# Patient Record
Sex: MALE | Race: WHITE | NOT HISPANIC OR LATINO | URBAN - METROPOLITAN AREA
[De-identification: names, ages, dates, MRNs, and addresses within clinical notes are randomized per-mention and may not be internally consistent; named-entity substitution may affect disease eponyms.]

---

## 2020-02-24 ENCOUNTER — APPOINTMENT (RX ONLY)
Dept: URBAN - METROPOLITAN AREA CLINIC 319 | Facility: CLINIC | Age: 60
Setting detail: DERMATOLOGY
End: 2020-02-24

## 2020-02-24 DIAGNOSIS — L71.8 OTHER ROSACEA: ICD-10-CM

## 2020-02-24 PROCEDURE — ? PRESCRIPTION

## 2020-02-24 PROCEDURE — 99202 OFFICE O/P NEW SF 15 MIN: CPT

## 2020-02-24 PROCEDURE — ? FULL BODY SKIN EXAM - DECLINED

## 2020-02-24 PROCEDURE — ? COUNSELING

## 2020-02-24 RX ADMIN — METRONIDAZOLE: 7.5 CREAM TOPICAL at 00:00

## 2020-02-24 ASSESSMENT — LOCATION SIMPLE DESCRIPTION DERM: LOCATION SIMPLE: LEFT CHEEK

## 2020-02-24 ASSESSMENT — LOCATION ZONE DERM: LOCATION ZONE: FACE

## 2020-02-24 ASSESSMENT — LOCATION DETAILED DESCRIPTION DERM: LOCATION DETAILED: LEFT CENTRAL MALAR CHEEK

## 2020-02-25 RX ORDER — METRONIDAZOLE 7.5 MG/G
CREAM TOPICAL
Qty: 1 | Refills: 5 | Status: ERX | COMMUNITY
Start: 2020-02-24

## 2023-03-10 LAB — SARS-COV-2 RESULT: NOT DETECTED

## 2023-07-07 ENCOUNTER — DOCUMENTATION (OUTPATIENT)
Dept: CARE COORDINATION | Facility: CLINIC | Age: 63
End: 2023-07-07
Payer: COMMERCIAL

## 2023-07-21 ENCOUNTER — DOCUMENTATION (OUTPATIENT)
Dept: CARE COORDINATION | Facility: CLINIC | Age: 63
End: 2023-07-21
Payer: COMMERCIAL

## 2023-08-24 ENCOUNTER — PATIENT OUTREACH (OUTPATIENT)
Dept: CARE COORDINATION | Facility: CLINIC | Age: 63
End: 2023-08-24
Payer: COMMERCIAL

## 2023-08-24 NOTE — PROGRESS NOTES
Patient has been identified to meet criteria for eligibility for the PCCL program. The  reached out to the patient who stated they were at work. It is best to contact prior to or around 11am. Rescheduled outreach for 8/29/23.

## 2023-08-29 ENCOUNTER — PATIENT OUTREACH (OUTPATIENT)
Dept: CARE COORDINATION | Facility: CLINIC | Age: 63
End: 2023-08-29
Payer: COMMERCIAL

## 2023-08-29 NOTE — PROGRESS NOTES
Patient has been identified to meet criteria for eligibility for the PCCL program. LVM to provide information.

## 2023-09-14 ENCOUNTER — LAB (OUTPATIENT)
Dept: LAB | Facility: LAB | Age: 63
End: 2023-09-14
Payer: COMMERCIAL

## 2023-09-14 LAB
ALANINE AMINOTRANSFERASE (SGPT) (U/L) IN SER/PLAS: 18 U/L (ref 10–52)
ALBUMIN (G/DL) IN SER/PLAS: 4 G/DL (ref 3.4–5)
ALKALINE PHOSPHATASE (U/L) IN SER/PLAS: 105 U/L (ref 33–136)
ANION GAP IN SER/PLAS: 11 MMOL/L (ref 10–20)
ASPARTATE AMINOTRANSFERASE (SGOT) (U/L) IN SER/PLAS: 16 U/L (ref 9–39)
BASOPHILS (10*3/UL) IN BLOOD BY AUTOMATED COUNT: 0.08 X10E9/L (ref 0–0.1)
BASOPHILS/100 LEUKOCYTES IN BLOOD BY AUTOMATED COUNT: 1 % (ref 0–2)
BILIRUBIN TOTAL (MG/DL) IN SER/PLAS: 0.6 MG/DL (ref 0–1.2)
CALCIDIOL (25 OH VITAMIN D3) (NG/ML) IN SER/PLAS: 32 NG/ML
CALCIUM (MG/DL) IN SER/PLAS: 9.2 MG/DL (ref 8.6–10.3)
CARBON DIOXIDE, TOTAL (MMOL/L) IN SER/PLAS: 27 MMOL/L (ref 21–32)
CHLORIDE (MMOL/L) IN SER/PLAS: 104 MMOL/L (ref 98–107)
CHOLESTEROL (MG/DL) IN SER/PLAS: 132 MG/DL (ref 0–199)
CHOLESTEROL IN HDL (MG/DL) IN SER/PLAS: 48.3 MG/DL
CHOLESTEROL/HDL RATIO: 2.7
COBALAMIN (VITAMIN B12) (PG/ML) IN SER/PLAS: 668 PG/ML (ref 211–911)
CREATININE (MG/DL) IN SER/PLAS: 0.74 MG/DL (ref 0.5–1.3)
EOSINOPHILS (10*3/UL) IN BLOOD BY AUTOMATED COUNT: 0.41 X10E9/L (ref 0–0.7)
EOSINOPHILS/100 LEUKOCYTES IN BLOOD BY AUTOMATED COUNT: 5.2 % (ref 0–6)
ERYTHROCYTE DISTRIBUTION WIDTH (RATIO) BY AUTOMATED COUNT: 16.8 % (ref 11.5–14.5)
ERYTHROCYTE MEAN CORPUSCULAR HEMOGLOBIN CONCENTRATION (G/DL) BY AUTOMATED: 30.8 G/DL (ref 32–36)
ERYTHROCYTE MEAN CORPUSCULAR VOLUME (FL) BY AUTOMATED COUNT: 77 FL (ref 80–100)
ERYTHROCYTES (10*6/UL) IN BLOOD BY AUTOMATED COUNT: 5.75 X10E12/L (ref 4.5–5.9)
GFR MALE: >90 ML/MIN/1.73M2
GLUCOSE (MG/DL) IN SER/PLAS: 200 MG/DL (ref 74–99)
HEMATOCRIT (%) IN BLOOD BY AUTOMATED COUNT: 44.2 % (ref 41–52)
HEMOGLOBIN (G/DL) IN BLOOD: 13.6 G/DL (ref 13.5–17.5)
IMMATURE GRANULOCYTES/100 LEUKOCYTES IN BLOOD BY AUTOMATED COUNT: 0.4 % (ref 0–0.9)
LDL: 64 MG/DL (ref 0–99)
LEUKOCYTES (10*3/UL) IN BLOOD BY AUTOMATED COUNT: 7.8 X10E9/L (ref 4.4–11.3)
LYMPHOCYTES (10*3/UL) IN BLOOD BY AUTOMATED COUNT: 1.57 X10E9/L (ref 1.2–4.8)
LYMPHOCYTES/100 LEUKOCYTES IN BLOOD BY AUTOMATED COUNT: 20 % (ref 13–44)
MONOCYTES (10*3/UL) IN BLOOD BY AUTOMATED COUNT: 0.7 X10E9/L (ref 0.1–1)
MONOCYTES/100 LEUKOCYTES IN BLOOD BY AUTOMATED COUNT: 8.9 % (ref 2–10)
NEUTROPHILS (10*3/UL) IN BLOOD BY AUTOMATED COUNT: 5.05 X10E9/L (ref 1.2–7.7)
NEUTROPHILS/100 LEUKOCYTES IN BLOOD BY AUTOMATED COUNT: 64.5 % (ref 40–80)
NRBC (PER 100 WBCS) BY AUTOMATED COUNT: 0 /100 WBC (ref 0–0)
PLATELETS (10*3/UL) IN BLOOD AUTOMATED COUNT: 225 X10E9/L (ref 150–450)
POTASSIUM (MMOL/L) IN SER/PLAS: 3.9 MMOL/L (ref 3.5–5.3)
PROTEIN TOTAL: 6.7 G/DL (ref 6.4–8.2)
SODIUM (MMOL/L) IN SER/PLAS: 138 MMOL/L (ref 136–145)
THYROTROPIN (MIU/L) IN SER/PLAS BY DETECTION LIMIT <= 0.05 MIU/L: 0.6 MIU/L (ref 0.44–3.98)
TOBACCO SCREEN, URINE: NEGATIVE
TRIGLYCERIDE (MG/DL) IN SER/PLAS: 101 MG/DL (ref 0–149)
URATE (MG/DL) IN SER/PLAS: 2.3 MG/DL (ref 4–7.5)
UREA NITROGEN (MG/DL) IN SER/PLAS: 15 MG/DL (ref 6–23)
VLDL: 20 MG/DL (ref 0–40)

## 2023-09-19 ENCOUNTER — TELEPHONE (OUTPATIENT)
Dept: CARE COORDINATION | Facility: CLINIC | Age: 63
End: 2023-09-19
Payer: COMMERCIAL

## 2023-10-02 PROBLEM — E11.69 ERECTILE DYSFUNCTION DUE TO TYPE 2 DIABETES MELLITUS (MULTI): Status: ACTIVE | Noted: 2023-10-02

## 2023-10-02 PROBLEM — Z87.442 PERSONAL HISTORY OF URINARY CALCULI: Status: ACTIVE | Noted: 2023-03-15

## 2023-10-02 PROBLEM — Z79.01 LONG TERM (CURRENT) USE OF ANTICOAGULANTS: Status: ACTIVE | Noted: 2023-03-15

## 2023-10-02 PROBLEM — M54.40 LUMBAGO WITH SCIATICA: Status: ACTIVE | Noted: 2023-03-15

## 2023-10-02 PROBLEM — Z86.59 HISTORY OF DEPRESSION: Status: ACTIVE | Noted: 2023-10-02

## 2023-10-02 PROBLEM — I10 HYPERTENSION: Status: ACTIVE | Noted: 2023-10-02

## 2023-10-02 PROBLEM — N20.0 CALCULUS OF KIDNEY: Status: ACTIVE | Noted: 2019-04-03

## 2023-10-02 PROBLEM — F51.01 PRIMARY INSOMNIA: Status: ACTIVE | Noted: 2023-03-15

## 2023-10-02 PROBLEM — R68.83 CHILLS: Status: ACTIVE | Noted: 2023-10-02

## 2023-10-02 PROBLEM — R35.0 BENIGN PROSTATIC HYPERPLASIA WITH URINARY FREQUENCY: Status: ACTIVE | Noted: 2023-10-02

## 2023-10-02 PROBLEM — K52.9 ENTERITIS: Status: ACTIVE | Noted: 2023-10-02

## 2023-10-02 PROBLEM — F41.0 GENERALIZED ANXIETY DISORDER WITH PANIC ATTACKS: Status: ACTIVE | Noted: 2023-03-15

## 2023-10-02 PROBLEM — Z87.891 PERSONAL HISTORY OF NICOTINE DEPENDENCE: Status: ACTIVE | Noted: 2023-03-15

## 2023-10-02 PROBLEM — R52 BODY ACHES: Status: ACTIVE | Noted: 2023-10-02

## 2023-10-02 PROBLEM — E66.811 CLASS 1 OBESITY WITH BODY MASS INDEX (BMI) OF 34.0 TO 34.9 IN ADULT: Status: ACTIVE | Noted: 2023-10-02

## 2023-10-02 PROBLEM — Z96.641 STATUS POST TOTAL REPLACEMENT OF RIGHT HIP: Status: ACTIVE | Noted: 2023-05-27

## 2023-10-02 PROBLEM — F41.1 GENERALIZED ANXIETY DISORDER WITH PANIC ATTACKS: Status: ACTIVE | Noted: 2023-03-15

## 2023-10-02 PROBLEM — G62.9 PERIPHERAL NEUROPATHY: Status: ACTIVE | Noted: 2023-10-02

## 2023-10-02 PROBLEM — F43.10 PTSD (POST-TRAUMATIC STRESS DISORDER): Status: ACTIVE | Noted: 2023-10-02

## 2023-10-02 PROBLEM — E78.2 MIXED HYPERLIPIDEMIA: Status: ACTIVE | Noted: 2023-03-15

## 2023-10-02 PROBLEM — E11.36: Status: ACTIVE | Noted: 2023-10-02

## 2023-10-02 PROBLEM — N40.1 BENIGN PROSTATIC HYPERPLASIA WITH URINARY FREQUENCY: Status: ACTIVE | Noted: 2023-10-02

## 2023-10-02 PROBLEM — Z96.641 PRESENCE OF RIGHT ARTIFICIAL HIP JOINT: Status: ACTIVE | Noted: 2023-03-15

## 2023-10-02 PROBLEM — N40.1 BENIGN PROSTATIC HYPERPLASIA WITH LOWER URINARY TRACT SYMPTOMS: Status: ACTIVE | Noted: 2023-03-15

## 2023-10-02 PROBLEM — E55.9 VITAMIN D DEFICIENCY: Status: ACTIVE | Noted: 2023-10-02

## 2023-10-02 PROBLEM — K21.9 GERD (GASTROESOPHAGEAL REFLUX DISEASE): Status: ACTIVE | Noted: 2023-10-02

## 2023-10-02 PROBLEM — E66.9 CLASS 1 OBESITY WITH BODY MASS INDEX (BMI) OF 34.0 TO 34.9 IN ADULT: Status: ACTIVE | Noted: 2023-10-02

## 2023-10-02 PROBLEM — Z79.84 LONG TERM (CURRENT) USE OF ORAL HYPOGLYCEMIC DRUGS: Status: ACTIVE | Noted: 2023-03-15

## 2023-10-02 PROBLEM — K52.9 NONINFECTIVE GASTROENTERITIS AND COLITIS: Status: ACTIVE | Noted: 2023-03-15

## 2023-10-02 PROBLEM — E11.42 DIABETIC POLYNEUROPATHY ASSOCIATED WITH TYPE 2 DIABETES MELLITUS (MULTI): Status: ACTIVE | Noted: 2023-03-15

## 2023-10-02 PROBLEM — E66.9 OBESITY: Status: ACTIVE | Noted: 2023-03-15

## 2023-10-02 PROBLEM — N52.1 ERECTILE DYSFUNCTION DUE TO TYPE 2 DIABETES MELLITUS (MULTI): Status: ACTIVE | Noted: 2023-10-02

## 2023-10-02 RX ORDER — SERTRALINE HYDROCHLORIDE 100 MG/1
150 TABLET, FILM COATED ORAL
COMMUNITY
End: 2023-10-27

## 2023-10-02 RX ORDER — BUSPIRONE HYDROCHLORIDE 30 MG/1
30 TABLET ORAL 2 TIMES DAILY
COMMUNITY
End: 2024-01-09 | Stop reason: SDUPTHER

## 2023-10-02 RX ORDER — PANTOPRAZOLE SODIUM 40 MG/1
40 TABLET, DELAYED RELEASE ORAL DAILY
COMMUNITY
End: 2023-12-12 | Stop reason: SDUPTHER

## 2023-10-02 RX ORDER — METOPROLOL TARTRATE 100 MG/1
100 TABLET ORAL 2 TIMES DAILY
COMMUNITY
End: 2023-12-12 | Stop reason: SDUPTHER

## 2023-10-02 RX ORDER — TAMSULOSIN HYDROCHLORIDE 0.4 MG/1
0.4 CAPSULE ORAL DAILY PRN
COMMUNITY

## 2023-10-02 RX ORDER — METHOCARBAMOL 500 MG/1
500 TABLET, FILM COATED ORAL 2 TIMES DAILY
COMMUNITY
End: 2023-12-12 | Stop reason: SDUPTHER

## 2023-10-02 RX ORDER — SERTRALINE HYDROCHLORIDE 100 MG/1
100 TABLET, FILM COATED ORAL EVERY MORNING
COMMUNITY
End: 2023-10-27

## 2023-10-02 RX ORDER — CLONAZEPAM 0.5 MG/1
0.5 TABLET ORAL 2 TIMES DAILY PRN
COMMUNITY
End: 2023-12-12 | Stop reason: SDUPTHER

## 2023-10-02 RX ORDER — SILDENAFIL 50 MG/1
50 TABLET, FILM COATED ORAL DAILY
COMMUNITY
End: 2023-12-29 | Stop reason: SDUPTHER

## 2023-10-02 RX ORDER — DOXYCYCLINE 100 MG/1
100 CAPSULE ORAL DAILY
COMMUNITY
End: 2023-12-12 | Stop reason: SDUPTHER

## 2023-10-02 RX ORDER — ONDANSETRON 4 MG/1
4 TABLET, FILM COATED ORAL EVERY 8 HOURS PRN
COMMUNITY
Start: 2023-03-10 | End: 2023-11-28 | Stop reason: WASHOUT

## 2023-10-02 RX ORDER — PRAZOSIN HYDROCHLORIDE 1 MG/1
1 CAPSULE ORAL NIGHTLY
COMMUNITY
Start: 2023-08-30 | End: 2023-10-27

## 2023-10-02 RX ORDER — IBUPROFEN 600 MG/1
600 TABLET ORAL 2 TIMES DAILY PRN
COMMUNITY
Start: 2023-08-31

## 2023-10-02 RX ORDER — ATORVASTATIN CALCIUM 20 MG/1
20 TABLET, FILM COATED ORAL DAILY
COMMUNITY
End: 2023-12-12 | Stop reason: SDUPTHER

## 2023-10-02 RX ORDER — ASPIRIN 81 MG/1
1 TABLET ORAL DAILY
COMMUNITY

## 2023-10-02 RX ORDER — DICLOFENAC SODIUM 10 MG/G
GEL TOPICAL
COMMUNITY

## 2023-10-02 RX ORDER — METHOCARBAMOL 500 MG/1
500 TABLET, FILM COATED ORAL DAILY
COMMUNITY
Start: 2023-03-01 | End: 2023-11-28 | Stop reason: WASHOUT

## 2023-10-02 RX ORDER — DOCUSATE SODIUM 100 MG/1
100 CAPSULE, LIQUID FILLED ORAL DAILY
COMMUNITY
Start: 2023-03-10 | End: 2023-11-28 | Stop reason: WASHOUT

## 2023-10-02 RX ORDER — BUSPIRONE HYDROCHLORIDE 10 MG/1
10 TABLET ORAL DAILY
COMMUNITY
Start: 2023-03-01 | End: 2023-10-27 | Stop reason: SDUPTHER

## 2023-10-02 RX ORDER — APIXABAN 2.5 MG/1
2.5 TABLET, FILM COATED ORAL 2 TIMES DAILY
COMMUNITY
Start: 2023-03-14 | End: 2023-11-28 | Stop reason: WASHOUT

## 2023-10-02 RX ORDER — IBUPROFEN 800 MG/1
800 TABLET ORAL 2 TIMES DAILY PRN
COMMUNITY
End: 2023-11-28 | Stop reason: WASHOUT

## 2023-10-02 RX ORDER — BLOOD SUGAR DIAGNOSTIC
1 STRIP MISCELLANEOUS
COMMUNITY

## 2023-10-02 RX ORDER — METOPROLOL SUCCINATE 100 MG/1
100 TABLET, EXTENDED RELEASE ORAL 2 TIMES DAILY
COMMUNITY
Start: 2023-03-01 | End: 2023-11-28 | Stop reason: WASHOUT

## 2023-10-02 RX ORDER — DULOXETIN HYDROCHLORIDE 60 MG/1
60 CAPSULE, DELAYED RELEASE ORAL DAILY
COMMUNITY
End: 2024-01-09 | Stop reason: SDUPTHER

## 2023-10-02 RX ORDER — ONDANSETRON 4 MG/1
TABLET, FILM COATED ORAL EVERY 12 HOURS
COMMUNITY
End: 2023-11-28 | Stop reason: WASHOUT

## 2023-10-02 RX ORDER — LOSARTAN POTASSIUM 100 MG/1
100 TABLET ORAL DAILY
COMMUNITY
End: 2023-12-12 | Stop reason: ALTCHOICE

## 2023-10-02 RX ORDER — ACETAMINOPHEN 500 MG
TABLET ORAL
COMMUNITY
Start: 2023-03-14 | End: 2023-11-28 | Stop reason: WASHOUT

## 2023-10-02 RX ORDER — POLYETHYLENE GLYCOL 3350 17 G/17G
17 POWDER, FOR SOLUTION ORAL DAILY
COMMUNITY
Start: 2023-03-14 | End: 2023-11-28 | Stop reason: WASHOUT

## 2023-10-02 RX ORDER — CLONAZEPAM 0.5 MG/1
0.5 TABLET ORAL DAILY
COMMUNITY
Start: 2023-03-01 | End: 2023-11-28 | Stop reason: WASHOUT

## 2023-10-02 RX ORDER — LOSARTAN POTASSIUM 50 MG/1
100 TABLET ORAL DAILY
COMMUNITY
End: 2023-12-12 | Stop reason: SDUPTHER

## 2023-10-02 RX ORDER — HYDROCHLOROTHIAZIDE 12.5 MG/1
12.5 TABLET ORAL EVERY MORNING
COMMUNITY
End: 2023-11-28 | Stop reason: WASHOUT

## 2023-10-04 ENCOUNTER — CLINICAL SUPPORT (OUTPATIENT)
Dept: BEHAVIORAL HEALTH | Facility: CLINIC | Age: 63
End: 2023-10-04
Payer: COMMERCIAL

## 2023-10-04 VITALS
HEART RATE: 71 BPM | TEMPERATURE: 97.3 F | WEIGHT: 215.39 LBS | SYSTOLIC BLOOD PRESSURE: 157 MMHG | HEIGHT: 66 IN | BODY MASS INDEX: 34.62 KG/M2 | RESPIRATION RATE: 16 BRPM | DIASTOLIC BLOOD PRESSURE: 81 MMHG

## 2023-10-04 DIAGNOSIS — F33.1 MODERATE EPISODE OF RECURRENT MAJOR DEPRESSIVE DISORDER (MULTI): ICD-10-CM

## 2023-10-04 DIAGNOSIS — F43.10 PTSD (POST-TRAUMATIC STRESS DISORDER): ICD-10-CM

## 2023-10-04 DIAGNOSIS — F41.0 GENERALIZED ANXIETY DISORDER WITH PANIC ATTACKS: ICD-10-CM

## 2023-10-04 DIAGNOSIS — F41.1 GENERALIZED ANXIETY DISORDER WITH PANIC ATTACKS: ICD-10-CM

## 2023-10-04 PROCEDURE — 99214 OFFICE O/P EST MOD 30 MIN: CPT

## 2023-10-04 RX ORDER — LAMOTRIGINE 25 MG/1
TABLET ORAL
Qty: 30 TABLET | Refills: 1 | Status: SHIPPED | OUTPATIENT
Start: 2023-10-04 | End: 2023-11-28 | Stop reason: WASHOUT

## 2023-10-04 ASSESSMENT — PAIN SCALES - GENERAL: PAINLEVEL: 0-NO PAIN

## 2023-10-04 NOTE — PROGRESS NOTES
"Subjective   Patient is a 63 year old male with past medical history of IDDM, GERD, and total hip replacement, presenting to outpatient psychiatry for follow up evaluation and treatment or depression and PTSD.     Chief Complaint: Depression     Patient was unable to tolerate Prazosin. He stopped medication after a few doses. He continues to experience nightmares.     Patient reports that he is feeling edgy today, mostly from problems at work and with one coworker in particular. Patient reports that he feels frustrated and often feels as if there is a \"target on his back.\"      Patient appears irritable and he describes frustration, feelings of hopelessness, and feelings of worthlessness. He is disappointed that he hasn't been able to find a medication that effectively treats his symptoms    Patient is finding it difficult to get restless sleep though his appetite is described as \"OK.\"     Patient denies suicidal plan or intent but he endorses passive SI but stating, \"I would never act on my thoughts.: Patient is anhedonic but does enjoy spending time with his cat and finds that the cat soothes his anxiety. Patient denies panic attacks.       Depression  Visit Type: follow-up  Patient presents with the following symptoms: anhedonia, decreased concentration, depressed mood, fatigue, feelings of hopelessness, feelings of worthlessness, insomnia, irritability, restlessness, suicidal ideas and thoughts of death.  Patient is not experiencing: compulsions, confusion, excessive worry, hypersomnia, hyperventilation, memory impairment, muscle tension, nervousness/anxiety, obsessions, palpitations, panic, psychomotor agitation, psychomotor retardation and suicidal planning.  Frequency of symptoms: constantly   Severity: moderate   Sleep quality: fair  Nighttime awakenings: occasional  Compliance with medications:  %  Side effects:  Dry mouth    Review of Systems   Constitutional:  Positive for fatigue and irritability. " Negative for activity change, appetite change, chills, diaphoresis, fever and unexpected weight change.   HENT: Negative.     Eyes:  Negative for photophobia, pain, discharge, redness and itching.        Patient wears eyeglasses   Respiratory: Negative.     Cardiovascular: Negative.  Negative for palpitations.   Gastrointestinal: Negative.    Endocrine: Negative.    Genitourinary: Negative.    Musculoskeletal:  Positive for arthralgias and myalgias. Negative for back pain, gait problem and joint swelling.   Allergic/Immunologic: Negative.    Neurological:  Negative for dizziness, tremors, seizures, syncope, speech difficulty, weakness, light-headedness, numbness and headaches.   Psychiatric/Behavioral:  Positive for decreased concentration, depression, dysphoric mood, sleep disturbance and suicidal ideas. Negative for agitation, behavioral problems, confusion, hallucinations and self-injury. The patient has insomnia. The patient is not nervous/anxious and is not hyperactive.        Objective   Physical Exam  Constitutional:       Appearance: Normal appearance. He is normal weight.   HENT:      Head: Normocephalic and atraumatic.   Eyes:      Extraocular Movements: Extraocular movements intact.   Pulmonary:      Effort: Pulmonary effort is normal. No respiratory distress.      Breath sounds: No stridor.   Abdominal:      General: There is no distension.      Tenderness: There is no abdominal tenderness.   Musculoskeletal:         General: Normal range of motion.      Cervical back: Normal range of motion.   Skin:     General: Skin is dry.   Neurological:      General: No focal deficit present.      Mental Status: He is alert and oriented to person, place, and time. Mental status is at baseline.   Psychiatric:         Attention and Perception: Attention and perception normal.         Mood and Affect: Mood is depressed. Affect is blunt.         Speech: Speech normal.         Behavior: Behavior normal.         Thought  Content: Thought content normal. Thought content is not paranoid or delusional. Thought content does not include homicidal or suicidal ideation. Thought content does not include homicidal or suicidal plan.         Cognition and Memory: Cognition and memory normal.         Judgment: Judgment normal.      Comments: See mental status exam in screening section          Lab Review:   Lab on 09/14/2023   Component Date Value    Tobacco Screen, Urine 09/14/2023 NEGATIVE    Orders Only on 09/14/2023   Component Date Value    Cholesterol 09/14/2023 132     HDL 09/14/2023 48.3     Cholesterol/HDL Ratio 09/14/2023 2.7     LDL 09/14/2023 64     VLDL 09/14/2023 20     Triglycerides 09/14/2023 101     TSH 09/14/2023 0.60     Glucose 09/14/2023 200 (H)     Sodium 09/14/2023 138     Potassium 09/14/2023 3.9     Chloride 09/14/2023 104     Bicarbonate 09/14/2023 27     Anion Gap 09/14/2023 11     Urea Nitrogen 09/14/2023 15     Creatinine 09/14/2023 0.74     GFR MALE 09/14/2023 >90     Calcium 09/14/2023 9.2     Albumin 09/14/2023 4.0     Alkaline Phosphatase 09/14/2023 105     Total Protein 09/14/2023 6.7     AST 09/14/2023 16     Total Bilirubin 09/14/2023 0.6     ALT (SGPT) 09/14/2023 18     Vitamin D, 25-Hydroxy 09/14/2023 32     Uric Acid 09/14/2023 2.3 (L)     Vitamin B-12 09/14/2023 668     WBC 09/14/2023 7.8     nRBC 09/14/2023 0.0     RBC 09/14/2023 5.75     Hemoglobin 09/14/2023 13.6     Hematocrit 09/14/2023 44.2     MCV 09/14/2023 77 (L)     MCHC 09/14/2023 30.8 (L)     Platelets 09/14/2023 225     RDW 09/14/2023 16.8 (H)     Neutrophils % 09/14/2023 64.5     Immature Granulocytes %,* 09/14/2023 0.4     Lymphocytes % 09/14/2023 20.0     Monocytes % 09/14/2023 8.9     Eosinophils % 09/14/2023 5.2     Basophils % 09/14/2023 1.0     Neutrophils Absolute 09/14/2023 5.05     Lymphocytes Absolute 09/14/2023 1.57     Monocytes Absolute 09/14/2023 0.70     Eosinophils Absolute 09/14/2023 0.41     Basophils Absolute 09/14/2023  0.08        Assessment/Plan     Safety Assessment: (+)  current and past SI, no SA, no guns. RF include age, male gender, depression. PF pet cat, limited substance use history, . moderate risk     Diagnoses and all orders for this visit:  Moderate episode of recurrent major depressive disorder (CMS/HCC) Patient endorses depressed mood with passive SI and denies HI. Patient has failed multiple drug trials and would be placed in the treatment resistant depression category. Patient should continue in therapy to work on coping skills and to process his grief following his wife's death. Will start Lamictal today to help with mood lability and hopefully address his tendency to slide into a depression. 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 Patient verbally acknowledged symptoms to report and agrees to begin taking the medication     - lamoTRIgine (LaMICtal) 25 mg tablet; Take 1 tablet (25 mg) by mouth once daily, THEN 1 tablet (25 mg) once daily.  -Continue Duloxetine - 60 mg - take 1 tablet daily by mouth for depression     PTSD - Prazosin was not tolerated and has been discontinued. Continue to work in therapy with Dr. Larsen    Generalized Anxiety Disorder with Panic Attacks - patient continues to have baseline anxiety with infrequent panic attacks. Will continue current medications and recommend therapy     Continue - 30 mg Buspirone - take 1 tablet, twice daily for anxiety   Continue - 0.5 mg Clonazepam - take 1 tablet, twice daily as needed for panic attacks.     Follow up in one month

## 2023-10-05 ENCOUNTER — PATIENT OUTREACH (OUTPATIENT)
Dept: CARE COORDINATION | Facility: CLINIC | Age: 63
End: 2023-10-05
Payer: COMMERCIAL

## 2023-10-05 NOTE — PROGRESS NOTES
Patient has been identified to meet criteria for eligibility for the PCCL program. LVM for patient for outreach.

## 2023-10-07 ENCOUNTER — OFFICE VISIT (OUTPATIENT)
Dept: BEHAVIORAL HEALTH | Facility: CLINIC | Age: 63
End: 2023-10-07
Payer: COMMERCIAL

## 2023-10-07 DIAGNOSIS — F41.8 DEPRESSION WITH ANXIETY: ICD-10-CM

## 2023-10-07 PROCEDURE — 4010F ACE/ARB THERAPY RXD/TAKEN: CPT | Performed by: PSYCHOLOGIST

## 2023-10-07 PROCEDURE — 90837 PSYTX W PT 60 MINUTES: CPT | Performed by: PSYCHOLOGIST

## 2023-10-07 NOTE — PROGRESS NOTES
"2 PM 63094 Indiv Psych for Depression with Anxiety (60 MIN, 205-300). Virtual. Started lamotrigine (25 mg/day). Still reporting nightmare, last night, no longer on prazosin. Discussed work situation, boss \"ambushed\" him. Boss indicated a coworker had complained he refused to do something, considered it snitching. However, explained situation and indicated that it could be done in AM which was a customary practice. Additionally, was heard swearing, f-bomb, doesn't recall when it occurred. Anxious about event, never in trouble, trying  to decide what to do. Encouraged him to give it more time to cool before making a more permanent decision. Difficulty managing co-worker talking all the time. Old hs classmate invited out, went for walk. She's in [place] but visiting, enjoyed time. Some health concerns, high A1C, said snacking too much on sugar, and should cut snack at end of day. Doctor wants to increase jardiance and lower bp med. Next: 2 weeks.   "

## 2023-10-07 NOTE — PROGRESS NOTES
"2 PM 54678 Indiv Psych for Depression with Anxiety (60 MIN, 205-300). Virtual. Started lamotrigine (25 mg/day). Still reporting nightmare, last night, no longer on prazosin. Discussed work situation, boss \"ambushed\" him. Boss indicated a coworker had complained he refused to do something, considered it snitching. However, explained situation and indicated that it could be done in AM which was a customary practice. Additionally, was heard swearing, f-bomb, doesn't recall when it occurred. Anxious about event, never in trouble, trying  to decide what to do. Encouraged him to give it more time to cool before making a more permanent decision. Difficulty managing co-worker talking all the time. Old hs classmate invited out, went for walk. She's in [place] but visiting, enjoyed time. Some health concerns, high A1C, said snacking too much on sugar, and should cut snack at end of day. Doctor wants to increase jardiance and lower bp med. Next: 2 weeks.         "

## 2023-10-07 NOTE — PROGRESS NOTES
"2 PM 69153 Indiv Psych for Depression with Anxiety (60 MIN, 205-300). Virtual. Started lamotrigine (25 mg/day). Still reporting nightmare, last night, no longer on prazosin. Discussed work situation, boss \"ambushed\" him. Boss indicated a coworker had complained he refused to do something, considered it snitching. However, explained situation and indicated that it could be done in AM which was a customary practice. Additionally, was heard swearing, f-bomb, doesn't recall when it occurred. Anxious about event, never in trouble, trying  to decide what to do. Encouraged him to give it more time to cool before making a more permanent decision. Difficulty managing co-worker talking all the time. Old hs classmate invited out, went for walk. She's in [place] but visiting, enjoyed time. Some health concerns, high A1C, said snacking too much on sugar, and should cut snack at end of day. Doctor wants to increase jardiance and lower bp med. Next: 2 weeks.         "

## 2023-10-10 ENCOUNTER — TELEMEDICINE (OUTPATIENT)
Dept: BEHAVIORAL HEALTH | Facility: CLINIC | Age: 63
End: 2023-10-10
Payer: COMMERCIAL

## 2023-10-10 DIAGNOSIS — F41.9 ANXIETY: ICD-10-CM

## 2023-10-10 DIAGNOSIS — F33.1 MODERATE EPISODE OF RECURRENT MAJOR DEPRESSIVE DISORDER (MULTI): ICD-10-CM

## 2023-10-10 PROCEDURE — 90837 PSYTX W PT 60 MINUTES: CPT | Performed by: PSYCHOLOGIST

## 2023-10-10 ASSESSMENT — COLUMBIA-SUICIDE SEVERITY RATING SCALE - C-SSRS
REASONS FOR IDEATION PAST MONTH: MOSTLY TO END OR STOP THE PAIN (YOU COULDN'T GO ON LIVING WITH THE PAIN OR HOW YOU WERE FEELING)
1. HAVE YOU WISHED YOU WERE DEAD OR WISHED YOU COULD GO TO SLEEP AND NOT WAKE UP?: YES
1. IN THE PAST MONTH, HAVE YOU WISHED YOU WERE DEAD OR WISHED YOU COULD GO TO SLEEP AND NOT WAKE UP?: YES
2. HAVE YOU ACTUALLY HAD ANY THOUGHTS OF KILLING YOURSELF?: NO
TOTAL  NUMBER OF ACTUAL ATTEMPTS LIFETIME: 1
REASONS FOR IDEATION LIFETIME: MOSTLY TO END OR STOP THE PAIN (YOU COULDN'T GO ON LIVING WITH THE PAIN OR HOW YOU WERE FEELING)
ATTEMPT LIFETIME: YES
ATTEMPT PAST THREE MONTHS: NO

## 2023-10-10 ASSESSMENT — ENCOUNTER SYMPTOMS
COMPULSIONS: 0
HALLUCINATIONS: 0
HOPELESSNESS: 1
APPETITE CHANGE: 0
ANHEDONIA: 1
RESTLESSNESS: 1
LIGHT-HEADEDNESS: 0
CHILLS: 0
HEADACHES: 0
EYE REDNESS: 0
MUSCLE TENSION: 0
SLEEP QUALITY: FAIR
ACTIVITY CHANGE: 0
EYE DISCHARGE: 0
NUMBNESS: 0
RESPIRATORY NEGATIVE: 1
UNEXPECTED WEIGHT CHANGE: 0
FEELINGS OF WORTHLESSNESS: 1
HYPERACTIVE: 0
CONFUSION: 0
DIZZINESS: 0
EYE ITCHING: 0
EYE PAIN: 0
THOUGHTS THAT DEATH WOULD BE EASIER: 1
ARTHRALGIAS: 1
INSOMNIA: 1
NIGHTTIME AWAKENINGS: OCCASIONAL
FATIGUE: 1
SLEEP DISTURBANCE: 1
SEIZURES: 0
CARDIOVASCULAR NEGATIVE: 1
GASTROINTESTINAL NEGATIVE: 1
MYALGIAS: 1
DEPRESSION: 1
PALPITATIONS: 0
THOUGHT CONTENT - OBSESSIONS: 0
DYSPHORIC MOOD: 1
PANIC: 0
ENDOCRINE NEGATIVE: 1
DIAPHORESIS: 0
FEVER: 0
BACK PAIN: 0
NERVOUS/ANXIOUS: 0
AGITATION: 0
PSYCHOMOTOR AGITATION: 0
WEAKNESS: 0
PSYCHOMOTOR RETARDATION: 0
MEMORY IMPAIRMENT: 0
JOINT SWELLING: 0
IRRITABILITY: 1
HYPERSOMNIA: 0
SPEECH DIFFICULTY: 0
DEPRESSED MOOD: 1
TREMORS: 0
HYPERVENTILATION: 0
PHOTOPHOBIA: 0
ALLERGIC/IMMUNOLOGIC NEGATIVE: 1
DECREASED CONCENTRATION: 1

## 2023-10-10 NOTE — PATIENT INSTRUCTIONS
Plan for supportive psychotherapy    6. Follow up in one month     May follow up sooner if experiences worsening symptoms by calling  Psychiatry at [phone]      Patient verbalized an understanding to call [place] [facility] at [phone] ([facility]), [phone], or 778/go to the nearest emergency room if experiences thoughts of harm to self or others.

## 2023-10-10 NOTE — PROGRESS NOTES
"8 AM 38637 Indiv Psych for MDD, Recurrent, Anxiety (r/o SANDOR) (60 MIN, 689-182)  Not doing well. Is wondering if lamotrigine is causing problems, noted he's sensitive to medications. Has reached out to his pharmacologist. More hopeless ideation-\"If I wasn't here, no one would miss me. What do I have here?\" \"No wife, no kids, no care home money, feel hopeless.\" Said he has his cat, Barbara but no one would miss him at work. Barriers to killing self include his cat, doesn't want to end up worse, \"quadriplegic\", doesn't want to go to hell. Began to discussed options for treatment including IOP (, [place]), ECT, more consistently meeting with me and pharmacologist. He participated in an IOP at [facility] in the past, found it helpful. At that time, meeting with psychiatrist, Leodan Nino, got out of relationship with Ayesha. Had 6 ECT's in 2015 which helped then felt good when in [place]. This was shortly after Elin, wife . Said life and mood has been \"a roller coaster.\" Will complete FMLA work, will refer to IOP for questions, also he agreed to contact  at  who's been reaching out about an individualized program for him. Agreed to keep in more close contact. Next scheduled meetin week.    "

## 2023-10-11 ENCOUNTER — TELEPHONE (OUTPATIENT)
Dept: CARE COORDINATION | Facility: CLINIC | Age: 63
End: 2023-10-11
Payer: COMMERCIAL

## 2023-10-11 ENCOUNTER — PATIENT OUTREACH (OUTPATIENT)
Dept: CARE COORDINATION | Facility: CLINIC | Age: 63
End: 2023-10-11
Payer: COMMERCIAL

## 2023-10-11 NOTE — PROGRESS NOTES
Patient has been identified to meet criteria for eligibility for the PCCL program. Spoke with patient who is enrolling in program. Scheduled assessment for 10/18/2023 for 12:30pm. Will determine schedule and then schedule for Dr. Kat. Patient is currently concerned about medication interactions and coordination. Has went multiple medication changes and would like assistant with making sure all care is coordinated.

## 2023-10-18 ENCOUNTER — TELEMEDICINE (OUTPATIENT)
Dept: BEHAVIORAL HEALTH | Facility: CLINIC | Age: 63
End: 2023-10-18
Payer: COMMERCIAL

## 2023-10-18 ENCOUNTER — PATIENT OUTREACH (OUTPATIENT)
Dept: CARE COORDINATION | Facility: CLINIC | Age: 63
End: 2023-10-18

## 2023-10-18 DIAGNOSIS — F41.9 ANXIETY: ICD-10-CM

## 2023-10-18 DIAGNOSIS — F33.1 MODERATE EPISODE OF RECURRENT MAJOR DEPRESSIVE DISORDER (MULTI): ICD-10-CM

## 2023-10-18 PROCEDURE — 90837 PSYTX W PT 60 MINUTES: CPT | Performed by: PSYCHOLOGIST

## 2023-10-18 NOTE — PROGRESS NOTES
Outreach call to conduct quarterly assessment for Personalized Care for Complex Lives program. Spoke with patient, completed assessment.    Discussed being on intermittent FMLA to assist with dealing with symptoms.   Assessed for social needs- Discussed not currently having extended family and struggling with feeling lonely. Patient identified being social has been more of struggle moving back to [place]. Reports being back 3 for years. No children and .   Utilities- No needs identified.   Current mental state- Patient stated that his symptoms decreased dramatically after he stopped taking Lamictal. Informed that he had negative reactions to that medication.    No other needs currently identified.    Provided my contact information ([phone]) for future needs.  Scheduled patient follow up call.

## 2023-10-19 NOTE — PROGRESS NOTES
"2 PM 83554 Indiv Psych for MDD, Recurrent and Anxiety (60 MIN, 205-258)  Virtual. Supportive Therapy. Back to work, will complete FMLA paperwork for patient. Boss called him into office about two day miss from work. Reviewed again his meeting re. Not taking stuff to cath lab and F-bomb incident. Told boss, \"feel I have a target on my back.\" \"Still feel numb emotions. Discontinued lamotrigine. 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 Discussed PCC Program, had virtual visit with a clinical  at  and will meet with psychiatrist, Dr. Kat next week. Discussed difficulties he's had with meds, \"everything I take something I feel more depressed.\" Feels like things have been bad since returning to [place] three years ago. Felt he had more social supports and activities in other places. We discussed importance of him getting more involved, has been talking about getting back to [employer]'s witness, was involved in Je[employer]'s witness in the past. Also, used to [employer].  Does have a few friends. Close friend, Ruddy and partner, Leah, moved to [facility], also has friend Jay and Kat who will be moving over the next few years. Discussed how this friend is \"far right\" politically but doesn't discuss and they're able to find common ground. Also, has friend, Norah, he gets together with. Friend's   a month after his wife . Talks about going back to places like [place] and [facility], \"miss the mountains.\" Will complete FMLA paperwork. Next: 2 weeks.   "

## 2023-10-20 ENCOUNTER — APPOINTMENT (OUTPATIENT)
Dept: BEHAVIORAL HEALTH | Facility: CLINIC | Age: 63
End: 2023-10-20
Payer: COMMERCIAL

## 2023-10-27 ENCOUNTER — TELEMEDICINE (OUTPATIENT)
Dept: BEHAVIORAL HEALTH | Facility: CLINIC | Age: 63
End: 2023-10-27
Payer: COMMERCIAL

## 2023-10-27 DIAGNOSIS — F33.1 MODERATE EPISODE OF RECURRENT MAJOR DEPRESSIVE DISORDER (MULTI): ICD-10-CM

## 2023-10-27 PROCEDURE — 99205 OFFICE O/P NEW HI 60 MIN: CPT | Performed by: STUDENT IN AN ORGANIZED HEALTH CARE EDUCATION/TRAINING PROGRAM

## 2023-10-27 PROCEDURE — 99417 PROLNG OP E/M EACH 15 MIN: CPT | Performed by: STUDENT IN AN ORGANIZED HEALTH CARE EDUCATION/TRAINING PROGRAM

## 2023-10-27 NOTE — PATIENT INSTRUCTIONS
- remember activation step to reach out to Norah, sister, or Diego before the end of next week  - will start doing therapy around acceptance and openness towards distressing emotions caused by loneliness and rejection  - no medication changes for now    Please send me a message in [facility] if things aren't going as expected or you are unsure about something we discussed. If this isn't working, you can call the psychiatry department line at [phone], or leave me a voicemail at [phone].  If you are having thoughts of harming yourself or ending your life, please call the national suicide hotline 24/7 at 771. For this and other mental health emergencies, such as losing touch with reality, call the [facility] 24/7 mobile crisis hotline at 109-260-2565, or dial 791 for emergency services.

## 2023-10-27 NOTE — Clinical Note
See my assessment of depression. He and I agreed on taking an ACT approach to depression, working on taking small, progressive, persistent steps toward engaging more in social activities, while also doing mindful exposures to the avoided feelings of loneliness and rejection that lead to depression. Dr. Larsen, it's up to you how you'd like to approach this (assuming you agree it makes sense) - could all be done with you, or we could supplement with our therapist (Latonia) to work on the acceptance part while you do the engagement/activation part. Latonia should be able to see him weekly. Open to your suggestions!  Pal, for personalized care team patients, I offer to either temporarily take over as psychiatrist (similar to IOP) or take a more supportive role, and the patient preferred to have me take over for now. But he will come back to you in probably a few months! Please let me know if anything seems off or missing about my conceptualization.

## 2023-10-27 NOTE — ASSESSMENT & PLAN NOTE
Mental habit pattern of not feeling confident that others like him or want him around, which results in loneliness as a childless  with social connections who are mostly  with children/grandchildren, and different priorities. This habit pattern originates in childhood, when his father would always tell him he wasn't good enough, and favored his brother. Has a recurrent dream (nightly for 20 years) about yelling at his father for not caring about him. Chronically suppresses deep sadness about the loss of his wife and tries to avoid feelings of being left out. This leads to further isolation, loss of purpose, and depression. Most medication trials have led to affective flattening, but Wellbutrin (in 2001) led to crying spells, likely because it lifted the depression that was serving to numb out feelings of loneliness and rejection could not be suppressed.    We decided to target the root problem, avoidance of feelings of loneliness and rejection, through mindful exposures, while also targeting environmental barriers to recovery through behavioral activation.  - next achievable step of behavioral activation toward socializing: go out with friend Norah, spend time with sister's family, or call friend Diego. 7/10 confident he can do one of these in the next week.  - mindful exposures with either Dr. Larsen or adding [facility] therapist to focus on that approach while Dr. Larsen focuses on behavioral activation  - no further medication adjustment; could reconsider Wellbutrin if needed for energy and motivation after root emotional distress has been alleviated somewhat  - While enrolled in [facility], I will assume psychiatrist role; plan to return to previous psychiatrist following graduation
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
## 2023-10-28 ENCOUNTER — TELEMEDICINE (OUTPATIENT)
Dept: BEHAVIORAL HEALTH | Facility: CLINIC | Age: 63
End: 2023-10-28
Payer: COMMERCIAL

## 2023-10-28 DIAGNOSIS — F41.9 ANXIETY: ICD-10-CM

## 2023-10-28 DIAGNOSIS — F33.1 MODERATE EPISODE OF RECURRENT MAJOR DEPRESSIVE DISORDER (MULTI): ICD-10-CM

## 2023-10-28 PROCEDURE — 90837 PSYTX W PT 60 MINUTES: CPT | Performed by: PSYCHOLOGIST

## 2023-10-30 NOTE — PROGRESS NOTES
"1 PM 85075 Indiv Psych for MDD, Recurrent and Anxiety (60 MIN, 112-210)  Virtual. Supportive Therapy. Discussed meeting with Dr. Kat and Dr. Kat's observation he's \"suppressing my feelings.\" Looking at his meds. Currently cymbalta 60 mg/day, buspar 2X30 mg/day and clonazepam 1.5 of 0.5 mg/day. Numerous losses over past 10 years. \"Feel trapped here.\" Sees things as not going well since returning to [facility]. However, things no so good prior to moving, mental health, etc. He's applied to a few jobs across country (e.g., [employer], [place]). Encouraged him to focus on improving connectedness to others. He does have a few friends here (e.g., Norah, who works where he does, lost , Ed/wife, Ajy). Meeting up with friend Ed Nov 8 when friend comes back from [facility]. Mentioned one time wanting to be an actor. Broached idea of community theater. Not many interests outside work currently. Completed his [place] paperwork. Next: 2 weeks.   "

## 2023-11-08 ENCOUNTER — TELEMEDICINE (OUTPATIENT)
Dept: BEHAVIORAL HEALTH | Facility: CLINIC | Age: 63
End: 2023-11-08
Payer: COMMERCIAL

## 2023-11-08 ENCOUNTER — APPOINTMENT (OUTPATIENT)
Dept: BEHAVIORAL HEALTH | Facility: CLINIC | Age: 63
End: 2023-11-08
Payer: COMMERCIAL

## 2023-11-08 DIAGNOSIS — F33.1 MODERATE EPISODE OF RECURRENT MAJOR DEPRESSIVE DISORDER (MULTI): ICD-10-CM

## 2023-11-08 PROCEDURE — 90837 PSYTX W PT 60 MINUTES: CPT | Performed by: COUNSELOR

## 2023-11-08 NOTE — PROGRESS NOTES
All Individuals Present: Patient and Provider (Encounter Provider)     ID: Danny Abel is a 63 y.o. male      Pt met with [facility] for follow-up visit for symptoms of depression.    An interactive audio and video telecommunication system which permits real time communications between the patient (at the originating site) and the provider (at the distant site) was utilized to provide this telehealth service. Verbal consent was requested and obtained from Danny Abel on this date 11/08/23 for a telehealth visit.    Pt is meeting with therapist as a part of the Personalized Care for Complex Lives Program.    Mental Status Exam  General Appearance: Well groomed, appropriate eye contact.  Attitude/Behavior: Cooperative, conversant, engaged, and with good eye contact.  Motor: No psychomotor agitation or retardation, no tremor or other abnormal movements.  Speech: Normal rate, volume, prosody  Gait/Station: Within normal limits  Mood: Dysthymic.  Affect: Flat  Thought Process: Linear, goal directed  Thought Associations: No loosening of associations  Thought Content: normal  Sensorium: Alert and oriented to person, place, time and situation  Insight: Intact, as evidenced by Pt discussion  Judgment: Intact      Risk Assessment:  Imminent Risk of Suicide or Serious Self-Injury: None, denied  Imminent Risk of Violence or Homicide: None, denied    Progress Note:  [facility] to establish relationship. Pt identified that he is feeling some extended grief and loneliness. Pt stated that he is looking toward making moves in his life to change and rebuild his self-confidence. [facility] was able to validate Pt and introduced the idea of sitting with his emotions when they surface. [facility] will continue to explore this with Pt at next session.    Start Time: 1:03 pm  End Time: 1:59 pm  CPT Code: 89902    Attestation Statements   Number of minutes spent performing psychotherapy: 56

## 2023-11-11 ENCOUNTER — TELEMEDICINE (OUTPATIENT)
Dept: BEHAVIORAL HEALTH | Facility: CLINIC | Age: 63
End: 2023-11-11
Payer: COMMERCIAL

## 2023-11-11 DIAGNOSIS — F33.1 MODERATE EPISODE OF RECURRENT MAJOR DEPRESSIVE DISORDER (MULTI): ICD-10-CM

## 2023-11-11 DIAGNOSIS — F41.9 ANXIETY: ICD-10-CM

## 2023-11-11 PROCEDURE — 90837 PSYTX W PT 60 MINUTES: CPT | Performed by: PSYCHOLOGIST

## 2023-11-14 NOTE — PROGRESS NOTES
"1 PM 08966 Indiv Psych for MDD, Recurrent and Anxiety (60 MIN, 101-156)  Virtual. Supportive Therapy. Applying for some jobs across the country (e.g., [email or web address], [place], [place]). Trying to be optimistic but hasn't head back from one in [place] after good interview. Still depressed, disconnected. \"Don't like going to [employer].\" He said he has to work harder than previous post and job is more physically demanding. Some anniversary dates--mother  Nov , 10 years since \"Pat and I moved to [place]\" and she . I miss her.\" Thursday, sluggish \"I hit a wall\" felt down, no motivation. Left work early then and took FMLA day yesterday. Feeling a little better today. \"Tired all the time.\" Discussed jobs he's applied to in detail and interview (Oct 31). Still having some problems learning [employer] system at [employer]. Challenging dealing with co-worker but managing better recently. On line dating not working out. Niece reached out for Thanksgiving invite. Also, invited to go to friend's (Ed). Taking robaxin at night, also still on duloxetine (60 mg/day). Still meeting with Dr. Kat. Next: 3 weeks.   "

## 2023-11-16 ENCOUNTER — TELEMEDICINE (OUTPATIENT)
Dept: BEHAVIORAL HEALTH | Facility: CLINIC | Age: 63
End: 2023-11-16
Payer: COMMERCIAL

## 2023-11-16 DIAGNOSIS — F33.1 MODERATE EPISODE OF RECURRENT MAJOR DEPRESSIVE DISORDER (MULTI): ICD-10-CM

## 2023-11-16 DIAGNOSIS — F41.0 GENERALIZED ANXIETY DISORDER WITH PANIC ATTACKS: ICD-10-CM

## 2023-11-16 DIAGNOSIS — F41.1 GENERALIZED ANXIETY DISORDER WITH PANIC ATTACKS: ICD-10-CM

## 2023-11-16 PROCEDURE — 90837 PSYTX W PT 60 MINUTES: CPT | Performed by: COUNSELOR

## 2023-11-16 NOTE — PROGRESS NOTES
All Individuals Present: Patient and Provider (Encounter Provider)     ID: Danny Abel is a 63 y.o. male      Pt met with [facility] for follow-up visit for symptoms of anxiety and depression.    An interactive audio and video telecommunication system which permits real time communications between the patient (at the originating site) and the provider (at the distant site) was utilized to provide this telehealth service. Verbal consent was requested and obtained from Danny Abel on this date 11/16/23 for a telehealth visit.    Pt is meeting with therapist as a part of the Personalized Care for Complex Lives Program.    Mental Status Exam  General Appearance: Well groomed, appropriate eye contact.  Attitude/Behavior: Cooperative, conversant, engaged, and with good eye contact.  Motor: No psychomotor agitation or retardation, no tremor or other abnormal movements.  Speech: Normal rate, volume, prosody  Gait/Station: Within normal limits  Mood: Depressed.  Affect: Sad/tearful and Flat  Thought Process: Linear, goal directed  Thought Associations: No loosening of associations  Thought Content: normal  Sensorium: Alert and oriented to person, place, time and situation  Insight: Fair, as evidenced by Pt discussion  Judgment: Intact      Risk Assessment:  Imminent Risk of Suicide or Serious Self-Injury: None, denied  Imminent Risk of Violence or Homicide: None, denied    Progress Note:  Pt stated that he has been having difficulty managing his mood. He reported that he has been crying a lot and it has been difficult to manage. He shared that he is not enjoying his job and is feeling frustrated. Pt has been attempting to reach out to the IOP therapist at , but has not heard back from her yet. [facility] encouraged Pt to think about reaching out to [place] to talk to them about their IOP groups.     Start Time: 12:03 pm  End Time: 1:00pm  CPT Code: 17684    Attestation Statements   Number of minutes spent  performing psychotherapy: 57

## 2023-11-21 ENCOUNTER — TELEMEDICINE (OUTPATIENT)
Dept: BEHAVIORAL HEALTH | Facility: CLINIC | Age: 63
End: 2023-11-21
Payer: COMMERCIAL

## 2023-11-21 DIAGNOSIS — F33.1 MODERATE EPISODE OF RECURRENT MAJOR DEPRESSIVE DISORDER (MULTI): ICD-10-CM

## 2023-11-21 PROCEDURE — 90834 PSYTX W PT 45 MINUTES: CPT | Performed by: COUNSELOR

## 2023-11-21 NOTE — PROGRESS NOTES
All Individuals Present: Patient and Provider (Encounter Provider)     ID: Danny Abel is a 63 y.o. male      Pt met with [facility] for follow-up visit for symptoms of depression.    An interactive audio and video telecommunication system which permits real time communications between the patient (at the originating site) and the provider (at the distant site) was utilized to provide this telehealth service. Verbal consent was requested and obtained from Danny Abel on this date 11/21/23 for a telehealth visit.    Pt is meeting with therapist as a part of the Personalized Care for Complex Lives Program.    Mental Status Exam  General Appearance: Well groomed, appropriate eye contact.  Attitude/Behavior: Cooperative, conversant, engaged, and with good eye contact.  Motor: No psychomotor agitation or retardation, no tremor or other abnormal movements.  Speech: Normal rate, volume, prosody  Gait/Station: Within normal limits  Mood: Dysthymic.  Affect: Flat  Thought Process: Linear, goal directed  Thought Associations: No loosening of associations  Thought Content: normal  Sensorium: Alert and oriented to person, place, time and situation  Insight: Fair, as evidenced by Pt discussion  Judgment: Intact      Risk Assessment:  Imminent Risk of Suicide or Serious Self-Injury: None, denied  Imminent Risk of Violence or Homicide: None, denied    Progress Note:  Pt shared that he is going to participate in the IOP groups at  in the next few weeks. He noted that he would like it to be in person, but he is okay to participate in virtual programming. Pt stated that his friends don't think it's a good idea for him to move away right now. He reported that he does sometimes feel like he is running away. Pt noted that he has not worked in any one location for more than a year since his wife's passing. [facility] mentioned to Pt that he may be using the excitement of changing jobs as a type of addictive behavior as it distracts  him from experiencing grief. Pt noted that he does think he is avoiding his grief.    Start Time: 12:04 pm  End Time: 12:54 pm  CPT Code: 85285    Attestation Statements   Number of minutes spent performing psychotherapy: 50

## 2023-11-22 ENCOUNTER — PATIENT OUTREACH (OUTPATIENT)
Dept: CARE COORDINATION | Facility: CLINIC | Age: 63
End: 2023-11-22

## 2023-11-22 ENCOUNTER — TELEMEDICINE (OUTPATIENT)
Dept: BEHAVIORAL HEALTH | Facility: CLINIC | Age: 63
End: 2023-11-22
Payer: COMMERCIAL

## 2023-11-22 DIAGNOSIS — F33.2 SEVERE RECURRENT MAJOR DEPRESSION WITHOUT PSYCHOTIC FEATURES (MULTI): ICD-10-CM

## 2023-11-22 DIAGNOSIS — F41.1 GENERALIZED ANXIETY DISORDER: ICD-10-CM

## 2023-11-22 PROCEDURE — 90791 PSYCH DIAGNOSTIC EVALUATION: CPT | Mod: U2,95

## 2023-11-22 NOTE — PROGRESS NOTES
The  reached out to the patient regarding Personalized Care for Complex Lives wellness check and to follow up on goals progress.   Patient confirmed no food insecurities, stable housing and assessed for social needs, utilities, current mental state.    Resources provided this meeting included: Information on [employer] and privacy accessing records.   Barriers addressed: Discussed wanting his records to be safeguarded.   The patient confirmed no SI/actions at this time.   Scheduled follow up check in for the following: When information is confirmed.   Provided contact information if contact is needed prior to next scheduled follow up.
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
## 2023-11-28 ENCOUNTER — TELEMEDICINE (OUTPATIENT)
Dept: BEHAVIORAL HEALTH | Facility: CLINIC | Age: 63
End: 2023-11-28
Payer: COMMERCIAL

## 2023-11-28 ENCOUNTER — APPOINTMENT (OUTPATIENT)
Dept: BEHAVIORAL HEALTH | Facility: CLINIC | Age: 63
End: 2023-11-28
Payer: COMMERCIAL

## 2023-11-28 DIAGNOSIS — Z79.899 MEDICATION MANAGEMENT: ICD-10-CM

## 2023-11-28 DIAGNOSIS — F41.1 GAD (GENERALIZED ANXIETY DISORDER): ICD-10-CM

## 2023-11-28 DIAGNOSIS — F33.1 MODERATE EPISODE OF RECURRENT MAJOR DEPRESSIVE DISORDER (MULTI): ICD-10-CM

## 2023-11-28 DIAGNOSIS — N52.1 ERECTILE DYSFUNCTION DUE TO TYPE 2 DIABETES MELLITUS (MULTI): ICD-10-CM

## 2023-11-28 DIAGNOSIS — E11.69 ERECTILE DYSFUNCTION DUE TO TYPE 2 DIABETES MELLITUS (MULTI): ICD-10-CM

## 2023-11-28 PROCEDURE — 99214 OFFICE O/P EST MOD 30 MIN: CPT | Performed by: NURSE PRACTITIONER

## 2023-11-28 RX ORDER — BUPROPION HYDROCHLORIDE 150 MG/1
150 TABLET ORAL EVERY MORNING
Qty: 90 TABLET | Refills: 3 | Status: SHIPPED | OUTPATIENT
Start: 2023-11-28 | End: 2024-01-09 | Stop reason: SDUPTHER

## 2023-11-28 ASSESSMENT — ENCOUNTER SYMPTOMS: CONSTITUTIONAL NEGATIVE: 1

## 2023-11-28 NOTE — PROGRESS NOTES
"Complex Care Follow-Up    Subjective   Danny Arcadio Briggspratik \"Leodan\" is a 63 y.o. male with ***    Previous Treatment Plan      Interim History      Psychiatric Medications      Objective   Mental Status Exam:       Lab Review:   {Recent labs:19471::\"not applicable\"}    Assessment/Plan     Problem List Items Addressed This Visit    None      {Follow Up:36713}    Suicide Risk Assessment  {TDSILIST:24676}    "

## 2023-11-28 NOTE — PROGRESS NOTES
"HPI  Danny Abel \"Leodan\" is a 63 y.o. male patient with a chief complaint of   Chief Complaint   Patient presents with    Anxiety    Depression    Med Management    presenting to outpatient treatment for a scheduled psych intensive outpatient psychiatric med check.    NOTE: Symptom scale is rated where 0 = no symptoms at all, and 10 = symptoms so severe that pt is an imminent danger to themselves or others and requires hospitalization.    Anxiety and Depression remain(s) present more days than not, which has remain unchanged over the past few weeks. Danny Abel rates the severity of psych symptoms as a 5/10, noting symptom improvement with watching TV, and worsening of symptoms with grief re: death of wife (Elin) in ~ and work-related stressors.    -Mood: \"I feel depressed a lot. I feel like I have no purpose in life. I wish I would have something to perk me up. Feel blah. My wife \" ~10 yrs ago \"and I still haven't come back from that.\" Reports Cymbalta helps \"a little bit with neuropathy.\"  -Sleep/Energy/Motivation: \"I like to sleep a lot - that's when I feel most at peace/comfortable.\" Denies issues falling/staying asleep - finds clonazepam 0.75mg helpful for sleep.  -Appetite/Weight Changes: denies issues.  -Psychosis: denies issues.  -SI/HI: denies issues this visit but does endorse feelings of worthlessness/hopelessness.      HISTORY  -Psych Hx: Dr. Bentley Larsen (psychologist - ); Dr. Ace Kat (psychiatrist - ); hx of multiple providers and therapists in various states. Attended IOP in  s/p end of  marriage.  -Psych Med Hx: gabapentin (for neuropathy - felt really depressed/tired, even on 100mg/day); sertraline (on it ~20 yrs, historically did well on 150mg/day, switched to Cymbalta to try to help with both psych symptoms and neuropathy); prazosin (\"I was so zoned out/tired\"); lamotrigine (\"that was horrible,\" increased SI); bupropion (first antidepressant pt tried in " "2001, \"I got crying jags, but I still have it - and that was 21 years ago\"); oxcarbazepine and risperidone (\"that wasn't good\"); nortriptyline 80mg (under care of psychiatrist, \"it was giving me dry mouth,\" but found it helpful for depression).  -Substance Use Hx: denies illicit substance use.  -Tobacco: endorses \"occasional use,\" denies currently using.  -Family Psych Hx: mother (anxiety/depression); father (depression)  -Family Hx of substance abuse: father (EtOH).  -Supports: endorses lack of social support (has moved many times since death of wife in ~2013).  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  -Education: Associates  -The past, family, and social history has been reviewed and there are no findings pertinent to the chief complaint.       REVIEW OF SYSTEMS  Review of Systems   Constitutional: Negative.    All other systems reviewed and are negative.  Objective   Physical Exam  Psychiatric:         Attention and Perception: Attention and perception normal.         Mood and Affect: Mood is anxious and depressed. Affect is flat.         Speech: Speech normal.         Behavior: Behavior normal. Behavior is cooperative.         Thought Content: Thought content normal.         Cognition and Memory: Cognition and memory normal.         Judgment: Judgment normal.       MEDICAL-DECISION MAKING  Mood-wise reporting primarily depressive (but also anxious) symptoms in context of chronic grief. After reviewing med regimen, seems as pt did best on sertraline. Had difficulty with bupropion as monotherapy, but might tolerate better with a SSRI on board. Could augment duloxetine with bupropion, but may be too much norepinephrine reuptake inhibition. After discussion mutually agreed to start bupropion now given the limited time in IOP, then, depending on how pt responds, may look to swap out duloxetine for sertraline.      I have reviewed the intake assessment and certify its " validity. Danny Abel has the physical capacity to participate and tolerate the interventions provided in the IOP schedule and curriculum. Aftercare attendance s/p discharge from [facility] was discussed.     Psych med regimen as follows:   1. Duloxetine 60mg/day  2. Buspirone 30mg BID  3. Clonazepam 0.75mg (0.5mg x1.5)/day-PRN (OARRS shows prescribed by Caden Augustin, DO up to 0.5mg BID, 30-day supply last filled 10/21/2023)  4. START: bupropion XL 150mg QAM    IMPRESSION  -SANDOR  -MDD, recurrent, moderate - active  -Grief    SI/HI ASSESSMENT  -Risk Assessment: The pt is currently a low acute risk of suicide and self-harm due to no past suicide attempt(s) and not currently endorsing thoughts of suicide. The pt is currently a low acute risk of violence and harm to others due to no past history of violence and not currently threatening others.  -Suicidal Risk Factors: , male, unmarried/single, living alone/lack of social support, chronic medical illness, chronic pain, current psychiatric illness, life crisis/shame/despair, and feelings of hopelessness  -Violence Risk Factors: male  -Protective Factors: fear of suicide, positive family relationships, and employment  -Plan to Reduce Risk: Establish medication regimen, outpatient follow-up care, and increase coping skills       PLAN  -Continue Medications as directed.  -Admit to IOP for up to 4 days/week or 12 hours/week starting approximately 11/28/2023.   -Follow-up with this provider in 1 week in [facility].  -Risks/benefits/assessment of medication interventions discussed with pt; pt agreeable to plan. Will continue to monitor for symptoms mgmt and SEs and adjust plan as needed.  -MI to increase coping skills/behavior regulation.  -Safety plan reviewed.  -Call  Psychiatry at [phone] with issues.  -For [facility] residents, [employer] is a 24/7 hotline you can call for assistance at [phone]) [phone]. Please call 911 or go to your closest Emergency Room  if you feel worse. This includes thoughts of hurting yourself or anyone else, or having other troubles such as hearing voices, seeing visions, or having new and scary thoughts about the people around you.

## 2023-11-29 ENCOUNTER — CLINICAL SUPPORT (OUTPATIENT)
Dept: BEHAVIORAL HEALTH | Facility: CLINIC | Age: 63
End: 2023-11-29
Payer: COMMERCIAL

## 2023-11-29 DIAGNOSIS — F41.1 GAD (GENERALIZED ANXIETY DISORDER): ICD-10-CM

## 2023-11-29 DIAGNOSIS — F33.2 SEVERE RECURRENT MAJOR DEPRESSION WITHOUT PSYCHOTIC FEATURES (MULTI): ICD-10-CM

## 2023-11-29 PROCEDURE — 90853 GROUP PSYCHOTHERAPY: CPT | Mod: U2,95

## 2023-11-30 ENCOUNTER — CLINICAL SUPPORT (OUTPATIENT)
Dept: BEHAVIORAL HEALTH | Facility: CLINIC | Age: 63
End: 2023-11-30
Payer: COMMERCIAL

## 2023-11-30 DIAGNOSIS — F41.1 GAD (GENERALIZED ANXIETY DISORDER): ICD-10-CM

## 2023-11-30 DIAGNOSIS — F33.2 SEVERE RECURRENT MAJOR DEPRESSION WITHOUT PSYCHOTIC FEATURES (MULTI): ICD-10-CM

## 2023-11-30 PROCEDURE — 90853 GROUP PSYCHOTHERAPY: CPT | Mod: U2,95

## 2023-11-30 NOTE — GROUP NOTE
Group Topic: Social Skills   Group Date: 11/30/2023  Start Time: 11:00 AM  End Time: 12:00 PM  Facilitators: OLVIN Anderson; OLVIN Aguila   [facility]    This was a video IOP group on a HIPAA compliant platform. All patients were provided with informed consent.     Date: 11/30/2023, Time: 11a-12p, Number of Patients: 10, User Name: [email or web address],  Number of Staff: 2  Group topic(s): defense mechanisms. The group explored the concept of defense mechanisms including when we may use them and the ways they may help us and hurt us. As a whole discussion was had on different types of defense mechanisms including denial, displacement, projection, rationalization, reaction formation, regression, repression, sublimation, and intellectualization. Examples and personal situations regarding using defense mechanisms in their day to day lives.    Name: Leodan Abel YOB: 1960   MR: [id number]      Leodan was on topic and present. Patient followed along and maintained attentiveness appropriately. Patient offered examples of where engaging in defense mechanisms could become detrimental to us.   OLVIN Wilder    Secondary facilitator: Luiz Ferguson, CT   CT supervised by OLVIN Hernández-S, [facility]

## 2023-11-30 NOTE — GROUP NOTE
Group Topic: Feeling Awareness/Expression   Group Date: 11/29/2023  Start Time:  9:00 AM  End Time: 10:00 AM  Facilitators: OLVIN Anderson; OLVIN Aguila   Department: [facility]    This was a video IOP group on a HIPAA compliant platform. All patients were provided with informed consent.     Date: 11/29/2023, Time: 9-10a, Number of Patients: 11, User Name: [email or web address],  Number of Staff: 2  Group topic(s): dino, thorn, bud reflection. The group engaged in exploring a reflection of their week thus far by identifying their dino (a positive, a highlight or a success), thorn (a challenge or something you can use more support with) and a bud (new idea or something you're looking forward to knowing or understanding more). Group discussion followed.      Name: Leodan Abel YOB: 1960   MR: [id number]      Leodan was on topic and participative. Patient followed along appropriately and engaged fully in exercise.   OLVIN Wilder    Secondary facilitator: RONIT العراقي  CT supervised by OLVIN Hernández-S, [facility]     
Group Topic: Social Skills   Group Date: 11/29/2023  Start Time: 10:00 AM  End Time: 11:00 AM  Facilitators: OLVIN Aguila; OLVIN Anderson   [facility]    Number of Participants: 12   Group Focus: communication, healthy friendships, self-awareness, and social skills  Treatment Modality: Behavior Modification Therapy, Cognitive Behavioral Therapy, Patient-Centered Therapy, and Skills Training  Interventions utilized were patient education, problem solving, and support  Purpose: coping skills, communication skills, insight or knowledge, and self-care    Name: Leodan Abel YOB: 1960   MR: [id number]    This session was conducted via HIPAA compliant video. Patient was provided with informed consent.  Date: 11/29/2023  Time: 10:00 am to 11:00 am  Group Members: 12  Number of Staff: 2  [email or web address]    Group Topic:  Group started with a review of Rozina Martin's 77203 method and how they tried it in the last 24 hours. The group then focused on psychoeducation of healthy vs. unhealthy boundaries. Appropriate boundaries were defined and group members identified individuals in their personal lives they would like to create healthier boundaries with and why.     Leodan was attentive and visible on camera. He appeared engaged in the group discussion but did not contribute. Today is his first session in [facility].    Primary Facilitator - JEFF Hernández, LILIYA  Secondary Facilitator - RONIT العراقي  Supervised by JEFF Hernández, [facility]  
Group Topic: Social Skills   Group Date: 11/29/2023  Start Time: 11:00 AM  End Time: 12:00 PM  Facilitators: OLVIN Aguila; OLVIN Anderson   [facility]    Number of Participants: 11   Group Focus: communication, coping skills, healthy friendships, problem solving, and social skills  Treatment Modality: Behavior Modification Therapy, Cognitive Behavioral Therapy, Patient-Centered Therapy, and Skills Training  Interventions utilized were exploration, group exercise, and problem solving  Purpose: coping skills, communication skills, and self-care    Name: Leodan Abel YOB: 1960   MR: [id number]    This session was conducted via HIPAA compliant video. Patient was provided with informed consent.  Date: 11/29/2023  Time: 11:00 am to 12:00 pm  Group Members: 11  Number of Staff: 2  [email or web address]    Group Topic:  Boundaries continued. The group continued to review what healthy boundaries entail and steps to take to develop them in their personal lives.     Leodan was attentive and visible on camera. He appeared to be actively listening to the group discussion. Today is his first session in [facility] and he seemed to be getting acclimating to the group experience.    Primary Facilitator - JEFF Hernández, LILIYA  Secondary Facilitator - RONIT العراقي  Supervised by JEFF Hernández, LILIYA    
Ambulatory

## 2023-11-30 NOTE — GROUP NOTE
Group Topic: Social Skills   Group Date: 11/30/2023  Start Time: 10:00 AM  End Time: 11:00 AM  Facilitators: OLVIN Anderson; OLVIN Aguila   [facility]    This was a video IOP group on a HIPAA compliant platform. All patients were provided with informed consent.     Date: 11/30/2023, Time: 10-11a, Number of Patients: 10, User Name: [email or web address],  Number of Staff: 2  Group topic(s): defense mechanisms. The group explored the concept of defense mechanisms including when we may use them and the ways they may help us and hurt us. As a whole discussion was had on different types of defense mechanisms including denial, displacement, projection, and rationalization. Examples and personal situations regarding using defense mechanisms in their day to day lives.    Name: Leodan Abel YOB: 1960   MR: [id number]      Leodan was attentive and on topic. Patient followed along fully and maintained nonverbal engagement throughout group exercise.   OLVIN Wilder    Secondary facilitator: Luiz Ferguson, CT   CT supervised by OLVIN Hernández-S, [facility]

## 2023-12-01 ENCOUNTER — DOCUMENTATION (OUTPATIENT)
Dept: BEHAVIORAL HEALTH | Facility: CLINIC | Age: 63
End: 2023-12-01
Payer: COMMERCIAL

## 2023-12-01 NOTE — PROGRESS NOTES
INTENSIVE OUTPATIENT PROGRAM MULTIDISCIPLINARY  TREATMENT PLAN & PROGRESS NOTE     Patient: Danny Abel  : 1960  Age: 63     Student: No  Treatment Team Date:    MRN#: [id number]    Attending Physician: Dr. BENTON Lopez MD  Date of IOP Admission: 23   Ref by: Dr. Larsen  Week: 1                        Admission Scores:   DASS21: 43 ANN: 25    BDI:  45 PHQ-9: 20 MAST: 0 DAST: 0 MDQ: Negative      Current Risk Assessment:  Suicidal Risk:  None:      Ideation: X       Plan:      Intent:      SI Plan with plan contracts for safety:  Hx of harming self:        Homicidal Risk:  None: X     Ideation:       Plan:      Intent:      HI Plan with means:  Hx of harming others:          Global Improvement  Clinical Global Impressions Rating Scale Of Illness:  6  1-No Illness Present   2-Minimal   3-Mild   4-Moderate   5-Marked   6-Severe X    7-Very Severe    Diagnoses & ICD-10 Codes  Primary Diagnosis & Code: Major Depressive Disorder, Recurrent, Severe; F33.2  Secondary Diagnosis & Code: Generalized Anxiety Disorder; F41.1     Psychosocial & Environmental Stressors:   1. Dissatisfaction with work   2. Lack of social support   3. Grief/loss    Patient's Treatment Goals:  Date Initiated:           Progress Update:               1.  Attend and actively participate in IOP _4_ days/week for 3 hours per day  2023   Good    Fair   Poor  Unclear X   2.  Attend weekly medication management sessions and maintain compliance of medications  2023     Good    Fair   Poor  Unclear X                                                   3.  Identify symptoms of depression and anxiety while developing coping plans  2023   Good    Fair   Poor  Unclear X                               4.  Increase self-esteem and self-worth while increasing social connections   2023  Good    Fair   Poor  Unclear X      Current medications:  See EMR chart       Progress note:  _X_New to the IOP program, attending as  planned  __Compliant, Progressing & Improving - Needs more time in IOP therapy program   __Compliant, Progressing & Improving Planning Discharge   __Compliant, Not Progressing or Improving: Reason  __Non-Compliant, not progressing or improving: Plan  __Other:    Insurance & Benefits: [facility], IN NETWORK, BENEFITS ARE BASED ON MEDICAL NECESSITY ONLY: Unlimited visits, covers 100% of the contracted rate after deductible has been met.     Co-Pay per day: $0    DEDUCTIBLE        Single: / Family: / Accumulation:  Single: /  Family: /   CO- INSURANCE  Single: / Family: / Accumulation:  Single: /  Family: /    OUT OF POCKET  Single: 1,600  Family: / Accumulation: Single: 1,600 Family: /     Total IOP Sessions completed & authorized to date:  2    Discharge plans have been discussed with patient & medication management provider on:   Reason for discharge:    ___Successfully completed IOP treatment:   ___Pt. decided to seek treatment elsewhere:   ___Dropped out or no show more than three times:  ___Benefits exhausted:   ___Other:    Discharge date:                     Discharge Prognosis: Excellent      Good     Fair     Poor    Follow up appointment with: Physician:   Follow up appointment with: Therapist:    Follow up Aftercare group?  Yes   Patient provided with Crisis Hotline phone number for suicide prevention? Yes     Discharge Scores: Not Completed    Emergency Treatment Plan Completed by patient: Yes__  No__          DASS21:   ANN:   BDI:   Treatment plan electronically signed by Treatment Team:   JUAN Nguyen, LPCC-S, [facility], MEJIA Somers, SABAC, JUAN Ball, PsSammi, JUAN Perla, APRN-CNP

## 2023-12-01 NOTE — GROUP NOTE
Group Topic: Goals   Group Date: 11/30/2023  Start Time:  9:00 AM  End Time: 10:00 AM  Facilitators: Ny Somers [facility]; OLVIN Aguila   Department: [facility]        Name: Leodan Abel YOB: 1960   MR: [id number]      This was a video IOP group on a HIPAA compliant platform. All patients were provided with informed consent.   Date: 11/30/2023, Time: 9-10a, Number of Patients: 11, User Name: JEFF Orourke, ATR, Number of Staff: 2  Group topic(s): SMART goal setting group today. Group members identified achievement goals, enjoyment goals, and social connection goals - as well as anticipated obstacles and coping skills. Prior to writing and discussing goals, the group revisited their strength-based goals set on Monday. Leodan plans to complete goals of shopping, calling a friend, taking a nap, and watching football. He emphasized how it was helpful to think about starting small with his goes, as the concept initially overwhelmed him. Leodan anticipates obstacles depression, low motivation, and anxiety. He plans to take deep breaths and call someone as coping mechanisms.    Primary Facilitator: Luiz Ferguson CT  Supervised by JEFF Hernández, [facility]     Secondary Facilitator: JEFF Hernández, [facility]

## 2023-12-02 ENCOUNTER — APPOINTMENT (OUTPATIENT)
Dept: BEHAVIORAL HEALTH | Facility: CLINIC | Age: 63
End: 2023-12-02
Payer: COMMERCIAL

## 2023-12-04 ENCOUNTER — CLINICAL SUPPORT (OUTPATIENT)
Dept: BEHAVIORAL HEALTH | Facility: CLINIC | Age: 63
End: 2023-12-04
Payer: COMMERCIAL

## 2023-12-04 DIAGNOSIS — F33.2 SEVERE RECURRENT MAJOR DEPRESSION WITHOUT PSYCHOTIC FEATURES (MULTI): ICD-10-CM

## 2023-12-04 DIAGNOSIS — F41.1 GAD (GENERALIZED ANXIETY DISORDER): ICD-10-CM

## 2023-12-04 PROCEDURE — 90853 GROUP PSYCHOTHERAPY: CPT | Mod: U2,95

## 2023-12-04 NOTE — GROUP NOTE
Group Topic: Goals   Group Date: 12/4/2023  Start Time:  9:00 AM  End Time: 10:00 AM  Facilitators: OLVIN Aguila; OLVIN Anderson   [facility]    Number of Participants: 12   Group Focus: activities of daily living skills, check in, daily focus, and goals  Treatment Modality: Behavior Modification Therapy, Cognitive Behavioral Therapy, and Patient-Centered Therapy  Interventions utilized were exploration, leisure development, problem solving, and support  Purpose: coping skills, insight or knowledge, self-care, and trigger / craving management    Name: Leodan Abel YOB: 1960   MR: [id number]    This was a video IOP group on a HIPAA compliant platform. All patients were provided with informed consent.     Date: 12/4/2023 Time: 9-10a, Number of Patients: 12, User Name: [email or web address], Number of Staff: 2    Group topic(s): SMART goal weekend review    Group members took turns sharing progress and challenges they faced this weekend related to their identified SMART goals.     Danny was present on camera and appeared attentive. He volunteered to share his SMART goals. He was successful in meeting the goals he set out to accomplish. 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 Another goal he wants to work towards is finding a position on first shift.     Primary Facilitator JEFF Hernández [facility]  Secondary Facilitator RONIT العراقي  Supervisor JEFF Hernández, [facility]

## 2023-12-05 ENCOUNTER — CLINICAL SUPPORT (OUTPATIENT)
Dept: BEHAVIORAL HEALTH | Facility: CLINIC | Age: 63
End: 2023-12-05
Payer: COMMERCIAL

## 2023-12-05 ENCOUNTER — TELEMEDICINE (OUTPATIENT)
Dept: BEHAVIORAL HEALTH | Facility: CLINIC | Age: 63
End: 2023-12-05
Payer: COMMERCIAL

## 2023-12-05 DIAGNOSIS — F33.1 MODERATE EPISODE OF RECURRENT MAJOR DEPRESSIVE DISORDER (MULTI): ICD-10-CM

## 2023-12-05 DIAGNOSIS — F41.1 GAD (GENERALIZED ANXIETY DISORDER): Primary | ICD-10-CM

## 2023-12-05 DIAGNOSIS — F33.2 SEVERE RECURRENT MAJOR DEPRESSION WITHOUT PSYCHOTIC FEATURES (MULTI): ICD-10-CM

## 2023-12-05 DIAGNOSIS — Z79.899 MEDICATION MANAGEMENT: ICD-10-CM

## 2023-12-05 DIAGNOSIS — F41.1 GAD (GENERALIZED ANXIETY DISORDER): ICD-10-CM

## 2023-12-05 PROCEDURE — 99214 OFFICE O/P EST MOD 30 MIN: CPT | Performed by: NURSE PRACTITIONER

## 2023-12-05 PROCEDURE — 90853 GROUP PSYCHOTHERAPY: CPT | Mod: U2,95

## 2023-12-05 ASSESSMENT — ENCOUNTER SYMPTOMS: CONSTITUTIONAL NEGATIVE: 1

## 2023-12-05 NOTE — PROGRESS NOTES
"HPI  Danny Abel \"Leodan\" is a 63 y.o. male patient with a chief complaint of   Chief Complaint   Patient presents with    Anxiety    Depression    Med Management    presenting to outpatient treatment for a scheduled psych intensive outpatient psychiatric med check.    NOTE: Symptom scale is rated where 0 = no symptoms at all, and 10 = symptoms so severe that pt is an imminent danger to themselves or others and requires hospitalization.    Anxiety and Depression remain(s) present more days than not, which has improved over the past few weeks. Danny Abel rates the severity of psych symptoms as a 5/10 (last rated 5/10), noting symptom improvement with watching TV, and worsening of symptoms with grief re: death of wife (Elin) in ~2013 and work-related stressors.    -Mood: \"the first couple days\" on bupropion \"I feel like I got a rush. Sometimes I feel like my heart's beating a little fast.\" Otherwise denies side effects, \"but I feel medicated.\" Started it 12/02/2023.   -Sleep/Energy/Motivation: noting sleeping a little less since starting bupropion but finds the clonazepam helpful to compensate.      Per hx: \"I like to sleep a lot - that's when I feel most at peace/comfortable.\" Denies issues falling/staying asleep - finds clonazepam 0.75mg helpful for sleep. Dx with [facility] in 2008 (2-3 sleep studies), \"I couldn't do the CPAP - I tried everything.\"  -Appetite/Weight Changes: denies issues/changes since last visit.   -Psychosis: denies issues/changes since last visit.   -SI/HI: denies issues/changes since last visit.       HISTORY  -Psych Hx: Dr. Bentley Larsen (psychologist - ); Dr. Ace Kat (psychiatrist - ); hx of multiple providers and therapists in various states. Attended [facility] in 2001 s/p end of 1st marriage.  -Psych Med Hx: gabapentin (for neuropathy - felt really depressed/tired, even on 100mg/day); sertraline (on it ~20 yrs, historically did well on 150mg/day, switched to Cymbalta to try to " "help with both psych symptoms and neuropathy); prazosin (\"I was so zoned out/tired\"); lamotrigine (\"that was horrible,\" increased SI); bupropion (first antidepressant pt tried in 2001, \"I got crying jags, but I still have it - and that was 21 years ago\"); oxcarbazepine and risperidone (\"that wasn't good\"); nortriptyline 80mg (under care of psychiatrist, \"it was giving me dry mouth,\" but found it helpful for depression).  -Substance Use Hx: denies illicit substance use.  -Tobacco: endorses \"occasional use,\" denies currently using.  -Family Psych Hx: mother (anxiety/depression); father (depression)  -Family Substance Abuse Hx: father (EtOH).  -Supports: endorses lack of social support (has moved many times since death of wife in ~2013).  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  -Education: [employer]  -The past, family, and social history has been reviewed and there are no findings pertinent to the chief complaint.       REVIEW OF SYSTEMS  Review of Systems   Constitutional: Negative.    All other systems reviewed and are negative.  Objective   Physical Exam  Psychiatric:         Attention and Perception: Attention and perception normal.         Mood and Affect: Mood is anxious and depressed. Affect is flat.         Speech: Speech normal.         Behavior: Behavior normal. Behavior is cooperative.         Thought Content: Thought content normal.         Cognition and Memory: Cognition and memory normal.         Judgment: Judgment normal.       MEDICAL-DECISION MAKING  Mood-wise showing some improvement since starting bupropion a few days ago. Mutually agreed to give the med more time to reach therapeutic levels before further adjustments.      As previously mentioned, if pt tolerates bupropion well, may look to swap out duloxetine for sertraline.     Psych med regimen as follows:   1. Duloxetine 60mg/day  2. Buspirone 30mg BID  3. Bupropion XL 150mg QAM  4. Clonazepam " 0.75mg (0.5mg x1.5)/day-PRN ([facility]RS shows prescribed by Caden Augustin, DO up to 0.5mg BID, 30-day supply last filled 11/28/2023)    IMPRESSION  -SANDOR  -MDD, recurrent, moderate - active  -Grief      PLAN  -Continue Medications as directed.  -Follow-up with this provider in 2 weeks in [facility].  -Risks/benefits/assessment of medication interventions discussed with pt; pt agreeable to plan. Will continue to monitor for symptoms mgmt and SEs and adjust plan as needed.  -MI to increase coping skills/behavior regulation.  -Safety plan reviewed.  -Call  Psychiatry at [phone] with issues.  -For [facility] residents, [facility] [employer] is a 24/7 hotline you can call for assistance at [phone]. Please call 911 or go to your closest Emergency Room if you feel worse. This includes thoughts of hurting yourself or anyone else, or having other troubles such as hearing voices, seeing visions, or having new and scary thoughts about the people around you.

## 2023-12-06 ENCOUNTER — CLINICAL SUPPORT (OUTPATIENT)
Dept: BEHAVIORAL HEALTH | Facility: CLINIC | Age: 63
End: 2023-12-06
Payer: COMMERCIAL

## 2023-12-06 DIAGNOSIS — F33.2 SEVERE RECURRENT MAJOR DEPRESSION WITHOUT PSYCHOTIC FEATURES (MULTI): ICD-10-CM

## 2023-12-06 DIAGNOSIS — F41.1 GAD (GENERALIZED ANXIETY DISORDER): ICD-10-CM

## 2023-12-06 PROCEDURE — 90853 GROUP PSYCHOTHERAPY: CPT | Mod: U2,95

## 2023-12-06 NOTE — GROUP NOTE
Group Topic: Personal Responsibility   Group Date: 12/4/2023  Start Time: 10:00 AM  End Time: 11:00 AM  Facilitators: OLVIN Anderson; OLVIN Aguila   Department: [facility]        Name: Leodan Abel YOB: 1960   MR: [id number]      This was a video IOP group on a HIPAA compliant platform. All patients were provided with informed consent.   Date: 12/04/2023, Time: 10-11a, Number of Patients: 13, User Name: JEFF Oroukre, ATR, Number of Staff: 2  Group topic(s): Today's group topic was Personal Recovery. After a few group members shared about their SMART goals/weekends, we moved in a general conversation about what the term “recovery” means to you. We then did an activity where group members were asked to draw their road to recovery as a literal road. During this discussion, group members shared about both the similarities and differences in others' recoveries. Leodan was attentive an engaged during the first hour of the recovery group but did not share/interact with the group.    Primary Facilitator: RONIT العراقي  Supervised by JEFF Hernández, [facility]     Secondary Facilitator: OLVIN Wilder

## 2023-12-06 NOTE — GROUP NOTE
Group Topic: Personal Responsibility   Group Date: 12/4/2023  Start Time: 11:00 AM  End Time: 12:00 PM  Facilitators: OLVIN Anderson; OLVIN Aguila   Department: [facility]        Name: Leodan Abel YOB: 1960   MR: [id number]      This was a video IOP group on a HIPAA compliant platform. All patients were provided with informed consent.   Date: 12/04/2023, Time: 11a-12p, Number of Patients: 13, User Name: JEFF Orourke, ATR, Number of Staff: 2  Group topic(s): Today's group topic was Personal Recovery. The group engaged in activity where they were asked to draw their recovery as a road and then place themselves on that road. Following the drawing, the group engaged in a variety of discussion questions that allowed for further processing of the topic. Leodan was attentive an engaged during the second hour of the recovery group but did not share/interact with the group.    Primary Facilitator: RONIT العراقي  Supervised by JEFF Hernández, [facility]     Secondary Facilitator: OLVIN Wilder

## 2023-12-06 NOTE — GROUP NOTE
Group Topic: Coping Skills   Group Date: 12/6/2023  Start Time:  9:00 AM  End Time: 10:00 AM  Facilitators: OLVIN Anderson; OLVIN Aguila   [facility]    This was a video IOP group on a HIPAA compliant program. All patients were provided with informed consent.     Date: 12/6/2023, Time: 9-10a, Number of Patients: 12, Username: [email or web address], Number of Staff: 2  Group Topic(s): coping overview. The group engaged in defining coping skills and exploring the use, benefits, and limitations to coping. Additionally, the group discussed the definition of coping skills and identified the difference between healthy coping and maladaptive coping. Group discussion followed.       Name: Leodan Abel YOB: 1960   MR: [id number]      Leodan was on topic and attentive. Patient remained quiet and reserved throughout group discussion yet maintained nonverbal engagement.   OLVIN Wilder    Secondary facilitator: RONIT العراقي   CT supervised by OLVIN Hernández-S, [facility]

## 2023-12-07 ENCOUNTER — CLINICAL SUPPORT (OUTPATIENT)
Dept: BEHAVIORAL HEALTH | Facility: CLINIC | Age: 63
End: 2023-12-07
Payer: COMMERCIAL

## 2023-12-07 DIAGNOSIS — F41.1 GAD (GENERALIZED ANXIETY DISORDER): ICD-10-CM

## 2023-12-07 DIAGNOSIS — F33.2 SEVERE RECURRENT MAJOR DEPRESSION WITHOUT PSYCHOTIC FEATURES (MULTI): ICD-10-CM

## 2023-12-07 PROCEDURE — 90853 GROUP PSYCHOTHERAPY: CPT | Mod: U2,95

## 2023-12-07 NOTE — GROUP NOTE
Group Topic: Coping Skills   Group Date: 12/7/2023  Start Time: 10:00 AM  End Time: 11:00 AM  Facilitators: OLVIN Anderson; OLVIN Aguila   [facility]    This was a video IOP group on a HIPAA compliant program. All patients were provided with informed consent.     Date: 12/7/2023, Time: 10-11a, Number of Patients: 14, Username: [email or web address], Number of Staff: 2  Group Topic(s): coping styles. The group engaged in discussing the definition of coping skills and identified the difference between healthy coping and maladaptive coping. As a whole, patients brainstormed coping skills falling into five categories including self-soothing, distractions, opposite action, emotional awareness, and mindfulness. Examples of self-soothing were discussed.     Name: Leodan Abel YOB: 1960   MR: [id number]      Leodan was attentive and on topic. Patient followed along appropriately and maintained nonverbal engagement throughout exercise.   OLVIN Wilder    Secondary facilitator: RONIT العراقي   CT supervised by OLVIN Hernández-S, [facility]

## 2023-12-07 NOTE — GROUP NOTE
Group Topic: Goals   Group Date: 12/7/2023  Start Time:  9:00 AM  End Time: 10:00 AM  Facilitators: OLVIN Anderson   Department:  [facility]        Name: Leodan Abel YOB: 1960   MR: [id number]      This was a video IOP group on a HIPAA compliant platform. All patients were provided with informed consent.   Date: 12/07/2023, Time: 9-10a, Number of Patients: 15, User Name: JEFF Orourke, ATR, Number of Staff: 2  Group topic(s): SMART goal setting group today. Group members identified achievement goals, enjoyment goals, and social connection goals - as well as anticipated obstacles and coping skills. Prior to writing and discussing goals, the group spent some time identifying and sharing their emotions using a feeling wheel. Leodan set goals to shop, wash blankets, go to dinner with friends, call a friend, and watch football. He anticipates his low mood and lack of motivation will act as obstacles to him completing his goals. He plans to try to stay positive and take deep breaths as coping mechanisms.     Primary Facilitator: RONIT العراقي  Supervised by JEFF Hernández, [facility]     Secondary Facilitator: JEFF Hernández, [facility]

## 2023-12-07 NOTE — GROUP NOTE
Group Topic: Coping Skills   Group Date: 12/7/2023  Start Time: 11:00 AM  End Time: 12:00 PM  Facilitators: OLVIN Anderson; OLVIN Aguila   [facility]    This was a video IOP group on a HIPAA compliant program. All patients were provided with informed consent.     Date: 12/7/2023, Time: 11a-12p, Number of Patients: [email or web address], Username: [email or web address], Number of Staff: 2  Group Topic(s): coping styles. The group continued discission on coping. As a whole, patients brainstormed coping skills falling into five categories including self-soothing, distractions, opposite action, emotional awareness, and mindfulness. Examples of distractions and opposite action were discussed.     Name: Leodan Abel YOB: 1960   MR: [id number]      Leodan was on topic and participative. Patient followed along fully and maintained appropriate attentiveness. Patient offered examples of coping via the chat.   OLVIN Wilder    Secondary facilitator: RONIT العراقي   CT supervised by OLVIN Hernández-S, [facility]

## 2023-12-07 NOTE — GROUP NOTE
Group Topic: Social Skills   Group Date: 12/6/2023  Start Time: 10:00 AM  End Time: 11:00 AM  Facilitators: OLVIN Aguila; OLVIN Anderson   [facility]    Number of Participants: 10   Group Focus: co-dependency, communication, family, healthy friendships, and self-awareness  Treatment Modality: Behavior Modification Therapy, Cognitive Behavioral Therapy, and Psychoeducation  Interventions utilized were exploration, patient education, and support  Purpose: maladaptive thinking, communication skills, and insight or knowledge    Name: Leodan Abel YOB: 1960   MR: [id number]    This session was conducted via HIPAA compliant video. Patient was provided with informed consent.    Date: 12/6/2023  Time: 10:00 am to 11:00 pm  Group Members: 10  Number of Staff: 2  [email or web address]    Group Topic:  The group was educated on the various forms of boundaries; enmeshed, rigid, healthy. The group reviewed signs that boundaries are being violated and tips on how to consistently set and maintain healthier boundaries.      Leodan was attentive and visible on camera. He was attentive as the information was presented. He did not verbally participate in the group discussion.    Primary Facilitator - JEFF Hernández, LILIYA  Secondary Facilitator - RONIT العراقي  Supervised by JEFF Hernández, [facility]

## 2023-12-07 NOTE — GROUP NOTE
Group Topic: Social Skills   Group Date: 12/6/2023  Start Time: 11:00 AM  End Time: 12:00 PM  Facilitators: OLVIN Aguila; OLVIN Anderson   [facility]    Number of Participants: 9   Group Focus: co-dependency, communication, family, feeling awareness/expression, healthy friendships, and self-awareness  Treatment Modality: Behavior Modification Therapy, Cognitive Behavioral Therapy, and Patient-Centered Therapy  Interventions utilized were exploration and group exercise  Purpose: communication skills and insight or knowledge    Name: Leodan Abel YOB: 1960   MR: [id number]    This session was conducted via HIPAA compliant video. Patient was provided with informed consent.  Date: 12/7/2023  Time: 11:00 am to 12:00 pm  Group Members: 9  Number of Staff: 2  [email or web address]    Group Topic:  Boundaries continued. The group participated in an exercise where they were asked to identify one person in which they need to strengthen boundaries. They were asked to share what this would look like and how their relationship would be different if they followed through with this boundary setting.      Danny was attentive and visible on camera. He did not volunteer to share his answers to the above-mentioned exercise with the group.    Primary Facilitator - JEFF Hernández, LILIYA  Secondary Facilitator - RONIT العراقي  Supervised by JEFF Hernández, LILIYA

## 2023-12-07 NOTE — GROUP NOTE
Group Topic: Feeling Awareness/Expression   Group Date: 12/5/2023  Start Time:  9:00 AM  End Time: 10:00 AM  Facilitators: OLVIN Aguila; Iraida Ball PsyD   [facility]    Number of Participants: 12   Group Focus: communication, feeling awareness/expression, healthy friendships, and self-awareness  Treatment Modality: Cognitive Behavioral Therapy, Interpersonal Therapy, and Patient-Centered Therapy  Interventions utilized were exploration and patient education  Purpose: feelings, insight or knowledge, and self-worth    Name: Leodan Abel YOB: 1960   MR: [id number]    This was a video IOP group on a HIPAA compliant platform. All patients were provided with informed consent.     Date:12/5/2023, Time: 9:00am-10:00am, Number of Patients: 12, User Name:[email or web address],  Number of Staff: 2    Group topic(s): Psychoeducation of the 5 love languages. Each was identified and defined. Group members identified their preferred ways of receiving affection and struggles with connecting with others.    Ledoan was present on camera. He appeared attentive as the information was provided to the group. He listened as others shared not wanting to let people get close to them due to being hurt in the past. He identified physical touch as his preferred mode of receiving affection. He shared this has been difficult to receive since the death of his wife 10 years ago but finds comfort in his pet being near.      Provider Signature: JEFF Hernández, LILIYA  Secondary facilitator(s): RONIT العراقي  Supervisor JEFF Hernández, LILIYA

## 2023-12-08 ENCOUNTER — TELEMEDICINE (OUTPATIENT)
Dept: BEHAVIORAL HEALTH | Facility: CLINIC | Age: 63
End: 2023-12-08
Payer: COMMERCIAL

## 2023-12-08 ENCOUNTER — DOCUMENTATION (OUTPATIENT)
Dept: BEHAVIORAL HEALTH | Facility: CLINIC | Age: 63
End: 2023-12-08

## 2023-12-08 DIAGNOSIS — F33.1 MODERATE EPISODE OF RECURRENT MAJOR DEPRESSIVE DISORDER (MULTI): ICD-10-CM

## 2023-12-08 PROBLEM — F33.2 SEVERE RECURRENT MAJOR DEPRESSION WITHOUT PSYCHOTIC FEATURES (MULTI): Status: ACTIVE | Noted: 2023-12-08

## 2023-12-08 PROCEDURE — 90834 PSYTX W PT 45 MINUTES: CPT | Performed by: COUNSELOR

## 2023-12-08 NOTE — PROGRESS NOTES
All Individuals Present: Patient and Provider (Encounter Provider)     ID: Danny Abel is a 63 y.o. male      Pt met with [facility] for follow-up visit for symptoms of depression.    An interactive audio and video telecommunication system which permits real time communications between the patient (at the originating site) and the provider (at the distant site) was utilized to provide this telehealth service. Verbal consent was requested and obtained from Danny Abel on this date 12/08/23 for a telehealth visit.    Pt is meeting with therapist as a part of the [facility] Complex Lives Program.    Mental Status Exam  General Appearance: Well groomed, appropriate eye contact.  Attitude/Behavior: Cooperative, conversant, engaged, and with good eye contact.  Motor: No psychomotor agitation or retardation, no tremor or other abnormal movements.  Speech: Normal rate, volume, prosody  Gait/Station: Within normal limits  Mood: Normal.  Affect: Dysphoric, constricted but reactive and Congruent with mood and topic of conversation  Thought Process: Linear, goal directed  Thought Associations: No loosening of associations  Thought Content: normal  Sensorium: Alert and oriented to person, place, time and situation  Insight: Intact, as evidenced by Pt discussion  Judgment: Intact      Risk Assessment:  Imminent Risk of Suicide or Serious Self-Injury: None, denied  Imminent Risk of Violence or Homicide: None, denied    Progress Note:  Pt reported that he has been participating in IOP for the past few weeks and is feeling like that is beneficial for him so far. He noted that he has started taking Wellbutrin with his anti-depressant to try to help him feel improved. He reported that he is feeling more energized after a week on it. Pt identified that he is feeling rejected from his job and it has impacted him over the past day. He is struggling with his feelings around this rejection. Pt shared that he is going  to go to an event for singles tomorrow with a friend, but he didn't want to go to a [employer] [place] event for grief tomorrow. He is working on better-managing his boundaries with his sister and has decided to stay home at [facility]. [facility] validated that Pt is setting appropriate boundaries for himself.    Start Time: 2:00 pm  End Time: 2:50 pm  CPT Code: 21285    Attestation Statements   Number of minutes spent performing psychotherapy: 50

## 2023-12-08 NOTE — PROGRESS NOTES
INTENSIVE OUTPATIENT PROGRAM MULTIDISCIPLINARY  TREATMENT PLAN & PROGRESS NOTE     Patient: Danny Abel  : 1960  Age: 63     Student: No  Treatment Team Date: 23  MRN#: [id number]    Attending Physician: Dr. BENTON Lopez MD  Date of IOP Admission: 23   Ref by: Dr. Larsen  Week: 2                       Admission Scores:   DASS21: 43 ANN: 25    BDI:  45 PHQ-9: 20 MAST: 0 DAST: 0 MDQ: Negative      Current Risk Assessment:  Suicidal Risk:  None:      Ideation: X       Plan:      Intent:      SI Plan with plan contracts for safety:  Hx of harming self:        Homicidal Risk:  None: X     Ideation:       Plan:      Intent:      HI Plan with means:  Hx of harming others:          Global Improvement  Clinical Global Impressions Rating Scale Of Illness:  6  1-No Illness Present   2-Minimal   3-Mild   4-Moderate   5-Marked   6-Severe X    7-Very Severe    Diagnoses & ICD-10 Codes  Primary Diagnosis & Code: Major Depressive Disorder, Recurrent, Severe; F33.2  Secondary Diagnosis & Code: Generalized Anxiety Disorder; F41.1     Psychosocial & Environmental Stressors:   1. Dissatisfaction with work   2. Lack of social support   3. Grief/loss    Patient's Treatment Goals:  Date Initiated:           Progress Update:               1.  Attend and actively participate in IOP _4_ days/week for 3 hours per day  2023   Good X    Fair       Poor  Unclear    2.  Attend weekly medication management sessions and maintain compliance of medications  2023     Good X    Fair       Poor  Unclear                                                    3.  Identify symptoms of depression and anxiety while developing coping plans  2023   Good        Fair X   Poor  Unclear                                4.  Increase self-esteem and self-worth while increasing social connections   2023  Good        Fair X   Poor  Unclear      Current medications:  See EMR chart       Progress note:  __New to the IOP program,  attending as planned  _X_Compliant, Progressing & Improving - Needs more time in IOP therapy program   __Compliant, Progressing & Improving Planning Discharge   __Compliant, Not Progressing or Improving: Reason  __Non-Compliant, not progressing or improving: Plan  __Other:    Insurance & Benefits: [employer] [facility], IN NETWORK, BENEFITS ARE BASED ON MEDICAL NECESSITY ONLY: Unlimited visits, covers 100% of the contracted rate after deductible has been met.     Co-Pay per day: $0    DEDUCTIBLE        Single: / Family: / Accumulation:  Single: /  Family: /   CO- INSURANCE  Single: / Family: / Accumulation:  Single: /  Family: /    OUT OF POCKET  Single: 1,600  Family: / Accumulation: Single: 1,600 Family: /      Total IOP Sessions completed & authorized to date:  6    Discharge plans have been discussed with patient & medication management provider on:   Reason for discharge:    ___Successfully completed IOP treatment:   ___Pt. decided to seek treatment elsewhere:   ___Dropped out or no show more than three times:  ___Benefits exhausted:   ___Other:    Discharge date:                     Discharge Prognosis: Excellent      Good     Fair     Poor    Follow up appointment with: Physician:   Follow up appointment with: Therapist:    Follow up Aftercare group?  Yes   Patient provided with Crisis Hotline phone number for suicide prevention? Yes     Discharge Scores: Not Completed    Emergency Treatment Plan Completed by patient: Yes__  No__          DASS21:   ANN:   BDI:   Treatment plan electronically signed by Treatment Team:   JUAN Nguyen, LPCC-S, LICKEHINDE, MEJIA Somers, OLVIN, JUAN Ball, PsSammi, JUAN Perla, APRN-CNP

## 2023-12-11 ENCOUNTER — CLINICAL SUPPORT (OUTPATIENT)
Dept: BEHAVIORAL HEALTH | Facility: CLINIC | Age: 63
End: 2023-12-11
Payer: COMMERCIAL

## 2023-12-11 DIAGNOSIS — F41.1 GAD (GENERALIZED ANXIETY DISORDER): ICD-10-CM

## 2023-12-11 DIAGNOSIS — F33.2 SEVERE RECURRENT MAJOR DEPRESSION WITHOUT PSYCHOTIC FEATURES (MULTI): ICD-10-CM

## 2023-12-11 PROCEDURE — 90853 GROUP PSYCHOTHERAPY: CPT | Mod: U2,95

## 2023-12-11 NOTE — GROUP NOTE
Group Topic: Goals   Group Date: 12/11/2023  Start Time:  9:00 AM  End Time: 10:00 AM  Facilitators: OLVIN Aguila; OLVIN Anderson   [facility]    Number of Participants: 13   Group Focus: activities of daily living skills, check in, daily focus, and goals  Treatment Modality: Behavior Modification Therapy, Cognitive Behavioral Therapy, and Patient-Centered Therapy  Interventions utilized were assignment, problem solving, and support  Purpose: coping skills, insight or knowledge, self-care, and relapse prevention strategies    Name: Leodan Abel YOB: 1960   MR: [id number]    This was a video IOP group on a HIPAA compliant platform. All patients were provided with informed consent.   Date: 12/11/2023  Time: 9-10a, Number of Patients: 13, User Name: [email or web address], Number of Staff: 2    Group topic(s): New patient welcome and SMART goal weekend review    Group members introduced themselves to new group members. Group members then took turns sharing progress and challenges they faced this weekend related to their identified SMART goals. The group then finished by identifying a weekly intention.    Danny was present on camera and appeared attentive. He shared feeling very ill this weekend with extreme cold and extreme heat. He is unsure what caused this drastic fluctuation in body temperature and almost called for a squad. He plans to schedule an appointment with his PCP this week. He shared having a conversation with his employer to request a 1st shift position. They are unable to accommodate this request and Danny plans to start looking at other positions.     Primary Facilitator OLVIN Hernández-S, [facility]  Secondary Facilitator Luiz Ferguson, [place]  Supervisor OLVIN Hernández-S, [facility]

## 2023-12-11 NOTE — GROUP NOTE
Group Topic: Coping Skills   Group Date: 12/5/2023  Start Time: 11:00 AM  End Time: 12:00 PM  Facilitators: Iraida Ball PsyD   Department:  [facility]    Number of Participants: 11   Group Focus: coping skills  Treatment Modality: Cognitive Behavioral Therapy  Interventions utilized were exploration and patient education  Purpose: coping skills and other: values    This was a video IOP group on a HIPAA compliant platform. All patients were provided with informed consent.  User Name: [email or web address]  Number of Staff: 2  Group topic: Values  Group members identified their own personal values and completed questions related to values exploration. They discussed what is most important to them, why these things are important and how their values have changed over time.   Iraida Ball Psy.D.  Secondary facilitator: RONIT العراقي  Supervisor Shania Narayan [facility]-S, ATR      Name: Leodan Abel YOB: 1960   MR: [id number]      Pt was present and actively listened to discussion.

## 2023-12-11 NOTE — GROUP NOTE
Group Topic: Coping Skills   Group Date: 12/5/2023  Start Time: 10:00 AM  End Time: 11:00 AM  Facilitators: Iraida Ball PsyD   Department:  [facility]    Number of Participants: 13   Group Focus: coping skills  Treatment Modality: Cognitive Behavioral Therapy  Interventions utilized were exploration and patient education  Purpose: coping skills and other: Values    This was a video IOP group on a HIPAA compliant platform. All patients were provided with informed consent.  User Name: [email or web address]  Number of Staff: 2  Group topic: Values  Group members reviewed topic from last week related to self-talk. Participants then received education on values. They explored what values mean to them and defined the concept. They began exploration into categories of values and how these are present in their personal lives.   Iraida Ball Psy.D.  Secondary facilitator: RONIT العراقي  Supervisor Shania Narayan, [facility]-S, ATR    Name: Leodan Abel YOB: 1960   MR: [id number]      Pt was present and actively listened to discussion.

## 2023-12-12 ENCOUNTER — OFFICE VISIT (OUTPATIENT)
Dept: PRIMARY CARE | Facility: CLINIC | Age: 63
End: 2023-12-12
Payer: COMMERCIAL

## 2023-12-12 ENCOUNTER — CLINICAL SUPPORT (OUTPATIENT)
Dept: BEHAVIORAL HEALTH | Facility: CLINIC | Age: 63
End: 2023-12-12
Payer: COMMERCIAL

## 2023-12-12 VITALS
DIASTOLIC BLOOD PRESSURE: 72 MMHG | HEIGHT: 67 IN | SYSTOLIC BLOOD PRESSURE: 128 MMHG | BODY MASS INDEX: 33.37 KG/M2 | WEIGHT: 212.6 LBS | OXYGEN SATURATION: 99 % | HEART RATE: 82 BPM

## 2023-12-12 DIAGNOSIS — E78.2 MIXED HYPERLIPIDEMIA: ICD-10-CM

## 2023-12-12 DIAGNOSIS — F33.2 SEVERE RECURRENT MAJOR DEPRESSION WITHOUT PSYCHOTIC FEATURES (MULTI): ICD-10-CM

## 2023-12-12 DIAGNOSIS — F41.1 GAD (GENERALIZED ANXIETY DISORDER): ICD-10-CM

## 2023-12-12 DIAGNOSIS — L70.0 ACNE VULGARIS: ICD-10-CM

## 2023-12-12 DIAGNOSIS — Z12.5 SCREENING FOR PROSTATE CANCER: ICD-10-CM

## 2023-12-12 DIAGNOSIS — F33.1 MODERATE EPISODE OF RECURRENT MAJOR DEPRESSIVE DISORDER (MULTI): ICD-10-CM

## 2023-12-12 DIAGNOSIS — E55.9 VITAMIN D DEFICIENCY: ICD-10-CM

## 2023-12-12 DIAGNOSIS — K21.9 GASTROESOPHAGEAL REFLUX DISEASE WITHOUT ESOPHAGITIS: ICD-10-CM

## 2023-12-12 DIAGNOSIS — E11.36: Primary | ICD-10-CM

## 2023-12-12 DIAGNOSIS — Z96.641 PRESENCE OF RIGHT ARTIFICIAL HIP JOINT: ICD-10-CM

## 2023-12-12 DIAGNOSIS — I10 PRIMARY HYPERTENSION: ICD-10-CM

## 2023-12-12 PROCEDURE — 99214 OFFICE O/P EST MOD 30 MIN: CPT | Performed by: STUDENT IN AN ORGANIZED HEALTH CARE EDUCATION/TRAINING PROGRAM

## 2023-12-12 PROCEDURE — 83036 HEMOGLOBIN GLYCOSYLATED A1C: CPT | Performed by: STUDENT IN AN ORGANIZED HEALTH CARE EDUCATION/TRAINING PROGRAM

## 2023-12-12 PROCEDURE — 3074F SYST BP LT 130 MM HG: CPT | Performed by: STUDENT IN AN ORGANIZED HEALTH CARE EDUCATION/TRAINING PROGRAM

## 2023-12-12 PROCEDURE — 3078F DIAST BP <80 MM HG: CPT | Performed by: STUDENT IN AN ORGANIZED HEALTH CARE EDUCATION/TRAINING PROGRAM

## 2023-12-12 PROCEDURE — 4010F ACE/ARB THERAPY RXD/TAKEN: CPT | Performed by: STUDENT IN AN ORGANIZED HEALTH CARE EDUCATION/TRAINING PROGRAM

## 2023-12-12 PROCEDURE — 1036F TOBACCO NON-USER: CPT | Performed by: STUDENT IN AN ORGANIZED HEALTH CARE EDUCATION/TRAINING PROGRAM

## 2023-12-12 PROCEDURE — 90853 GROUP PSYCHOTHERAPY: CPT | Mod: U2,95

## 2023-12-12 PROCEDURE — 3008F BODY MASS INDEX DOCD: CPT | Performed by: STUDENT IN AN ORGANIZED HEALTH CARE EDUCATION/TRAINING PROGRAM

## 2023-12-12 RX ORDER — DOXYCYCLINE 100 MG/1
100 CAPSULE ORAL DAILY
Qty: 90 CAPSULE | Refills: 1 | Status: SHIPPED | OUTPATIENT
Start: 2023-12-12

## 2023-12-12 RX ORDER — METHOCARBAMOL 500 MG/1
500 TABLET, FILM COATED ORAL 2 TIMES DAILY
Qty: 180 TABLET | Refills: 1 | Status: SHIPPED | OUTPATIENT
Start: 2023-12-12

## 2023-12-12 RX ORDER — PANTOPRAZOLE SODIUM 40 MG/1
40 TABLET, DELAYED RELEASE ORAL DAILY
Qty: 90 TABLET | Refills: 1 | Status: SHIPPED | OUTPATIENT
Start: 2023-12-12

## 2023-12-12 RX ORDER — LOSARTAN POTASSIUM 50 MG/1
100 TABLET ORAL DAILY
Qty: 90 TABLET | Refills: 1 | Status: SHIPPED | OUTPATIENT
Start: 2023-12-12 | End: 2023-12-12 | Stop reason: ENTERED-IN-ERROR

## 2023-12-12 RX ORDER — METOPROLOL TARTRATE 100 MG/1
100 TABLET ORAL 2 TIMES DAILY
Qty: 180 TABLET | Refills: 1 | Status: SHIPPED | OUTPATIENT
Start: 2023-12-12

## 2023-12-12 RX ORDER — LOSARTAN POTASSIUM 100 MG/1
100 TABLET ORAL DAILY
Qty: 90 TABLET | Refills: 3 | Status: SHIPPED | OUTPATIENT
Start: 2023-12-12 | End: 2024-12-11

## 2023-12-12 RX ORDER — ATORVASTATIN CALCIUM 20 MG/1
20 TABLET, FILM COATED ORAL DAILY
Qty: 90 TABLET | Refills: 1 | Status: SHIPPED | OUTPATIENT
Start: 2023-12-12

## 2023-12-12 RX ORDER — CLONAZEPAM 0.5 MG/1
0.5 TABLET ORAL 2 TIMES DAILY PRN
Qty: 60 TABLET | Refills: 2 | Status: SHIPPED | OUTPATIENT
Start: 2023-12-12 | End: 2024-01-09 | Stop reason: SDUPTHER

## 2023-12-12 ASSESSMENT — ENCOUNTER SYMPTOMS
CHILLS: 0
VOMITING: 0
RHINORRHEA: 0
PALPITATIONS: 0
ARTHRALGIAS: 0
COUGH: 0
SORE THROAT: 0
WHEEZING: 0
CONSTIPATION: 0
FREQUENCY: 0
DYSURIA: 0
FEVER: 0
DIARRHEA: 0
FATIGUE: 0
SHORTNESS OF BREATH: 0
FACIAL SWELLING: 0
NAUSEA: 0
JOINT SWELLING: 0
DIZZINESS: 0
ABDOMINAL PAIN: 0

## 2023-12-12 NOTE — GROUP NOTE
Group Topic: Feeling Awareness/Expression   Group Date: 12/11/2023  Start Time: 11:00 AM  End Time: 12:00 PM  Facilitators: OLVIN Anderson; OLVIN Aguila   [facility]        Name: Leodan Abel YOB: 1960   MR: [id number]      This was a video IOP group on a HIPAA compliant platform. All patients were provided with informed consent.   Date: 12/10/2023, Time: 11a-12p, Number of Patients: 15, User Name: [email or web address] Orourke, ATR, Number of Staff: 2  Group topic(s): Today's group topic was Breaking the Cycle. Group members engaged in conversations about how past generation's view(ed) mental health, the impacts these views had on the group, and the group's own views of mental health. Following these discussions, group member made a timeline of their family, labeled with the dominating thoughts on mental health of that time. The group was then asked to draw a line where they believe the change in views was made. Leodan was attentive and engaged during the 11am hour but did not participate.    Primary Facilitator: RONIT العراقي  Supervised by JEFF Hernández, [facility]     Secondary Facilitator: OLVIN Wilder

## 2023-12-12 NOTE — GROUP NOTE
Group Topic: Social Skills   Group Date: 12/12/2023  Start Time:  9:00 AM  End Time: 10:00 AM  Facilitators: OLVIN Aguila; Iraida Ball PsyD   [facility]    Number of Participants: 10   Group Focus: anger management, communication, coping skills, family, and healthy friendships  Treatment Modality: Behavior Modification Therapy, Cognitive Behavioral Therapy, Psychoeducation, and Solution-Focused Therapy  Interventions utilized were exploration and patient education  Purpose: communication skills and insight or knowledge    Name: Leodan Abel YOB: 1960   MR: [id number]    This was a video IOP group on a HIPAA compliant platform. All patients were provided with informed consent.     Date:12/12/2023, Time:9:00 am-10:00am, Number of Patients: 10, User Name:[email or web address],  Number of Staff: 2  Group topic(s): [facility] Four Horsemen and Antidotes    Group members reviewed [facility]'s four horsemen and the remedies. Members identified individual behaviors they engage in during times of conflict and remedies they plan to practice utilizing to make their communication more effective.    Leodan was present on camera and attentive as the content was reviewed. He listened as others shared about their personal behaviors and seemed reflective of the material and how it applies to his personal experiences.     Provider Signature: JEFF Hernández, LILIYA  Secondary facilitator(s): RONIT العراقي  Supervisor JEFF Hernández [facility]

## 2023-12-12 NOTE — GROUP NOTE
Group Topic: Feeling Awareness/Expression   Group Date: 12/11/2023  Start Time: 10:00 AM  End Time: 11:00 AM  Facilitators: OLVIN Anderson; OLVIN Aguila   Department: [facility]        Name: Leodan Abel YOB: 1960   MR: [id number]      This was a video IOP group on a HIPAA compliant platform. All patients were provided with informed consent.   Date: 12/10/2023, Time: 10-11a, Number of Patients: 15, User Name: JEFF Orourke, ATR, Number of Staff: 2  Group topic(s): Today's group topic was Breaking the Cycle. Group members engaged in conversations about how past generation's view(ed) mental health, the impacts these views had on the group, and the group's own views of mental health. Following these discussions, group member made a timeline of their family, labeled with the dominating thoughts on mental health of that time. The group was then asked to draw a line where they believe the change in views was made. Prior to the Breaking the Cycle group discussion, group members who did not share their SMART goal reviews in the 9am were asked to share. Leodan did not share during the 10am but was engaged and attentive throughout the hour.     Primary Facilitator: RONIT العراقي  Supervised by JEFF Hernández, [facility]     Secondary Facilitator: OLVIN Wilder

## 2023-12-12 NOTE — PROGRESS NOTES
"Subjective   Patient ID: Leodan Abel is a 63 y.o. male who presents for Chills, Hot Flashes, and Med Refill.    Med Refill  Pertinent negatives include no abdominal pain, arthralgias, chest pain, chills, congestion, coughing, fatigue, fever, joint swelling, nausea, rash, sore throat or vomiting.   Patient here for follow-up of hypertension, diabetes, hyperlipidemia, anxiety depression, insomnia.  A1c today 7.8%.  Has been feeling very low energy.  Has noticed polydipsia as well as polyuria.  Transitioning jobs once again.  Denies any thoughts of hurting self or others.  Does need refills of medications today.  Patient states that he just simply does not feel like himself at this point in time.  Has not been checking his sugars regularly    Review of Systems   Constitutional:  Negative for chills, fatigue and fever.   HENT:  Negative for congestion, ear pain, facial swelling, hearing loss, rhinorrhea and sore throat.    Respiratory:  Negative for cough, shortness of breath and wheezing.    Cardiovascular:  Negative for chest pain and palpitations.   Gastrointestinal:  Negative for abdominal pain, constipation, diarrhea, nausea and vomiting.   Genitourinary:  Negative for dysuria and frequency.   Musculoskeletal:  Negative for arthralgias and joint swelling.   Skin:  Negative for rash.   Neurological:  Negative for dizziness and syncope.       Objective   /72   Pulse 82   Ht 1.702 m (5' 7\")   Wt 96.4 kg (212 lb 9.6 oz)   SpO2 99%   BMI 33.30 kg/m²     Physical Exam  HENT:      Head: Normocephalic and atraumatic.      Right Ear: Tympanic membrane, ear canal and external ear normal.      Left Ear: Tympanic membrane, ear canal and external ear normal.      Nose: Nose normal.      Mouth/Throat:      Mouth: Mucous membranes are moist.      Pharynx: Oropharynx is clear. No posterior oropharyngeal erythema.   Eyes:      Conjunctiva/sclera: Conjunctivae normal.   Cardiovascular:      Rate and Rhythm: Normal " rate and regular rhythm.      Pulses: Normal pulses.   Pulmonary:      Effort: Pulmonary effort is normal.      Breath sounds: Normal breath sounds.   Abdominal:      General: Abdomen is flat.      Palpations: Abdomen is soft.   Skin:     General: Skin is warm and dry.   Neurological:      General: No focal deficit present.      Mental Status: He is alert.   Psychiatric:         Mood and Affect: Mood normal.         Behavior: Behavior normal.         Thought Content: Thought content normal.         Judgment: Judgment normal.       Assessment/Plan   Increase the Jardiance to 25 mg.  Continue remaining medication.  Patient needs a follow-up in 3 months for reevaluation of chronic conditions.  Recheck A1c at that point in time.  Blood work has been ordered.  I believe the reason he has been feeling slowed down could be that he has been having increased sugars.  Will reevaluate in 90 days

## 2023-12-13 ENCOUNTER — CLINICAL SUPPORT (OUTPATIENT)
Dept: BEHAVIORAL HEALTH | Facility: CLINIC | Age: 63
End: 2023-12-13
Payer: COMMERCIAL

## 2023-12-13 DIAGNOSIS — F41.1 GAD (GENERALIZED ANXIETY DISORDER): ICD-10-CM

## 2023-12-13 DIAGNOSIS — F33.2 SEVERE RECURRENT MAJOR DEPRESSION WITHOUT PSYCHOTIC FEATURES (MULTI): Primary | ICD-10-CM

## 2023-12-13 LAB — POC HEMOGLOBIN A1C: 7.8 % (ref 4.2–6.5)

## 2023-12-13 PROCEDURE — 90853 GROUP PSYCHOTHERAPY: CPT | Mod: U2,95

## 2023-12-13 NOTE — GROUP NOTE
Group Topic: Leisure Skills   Group Date: 12/13/2023  Start Time:  9:00 AM  End Time: 10:00 AM  Facilitators: OLVIN Anderson; OLVIN Aguila   Department: [facility]        Name: Leodan Abel YOB: 1960   MR: [id number]      This was a video IOP group on a HIPAA compliant platform. All patients were provided with informed consent.     Date: 12/13/2023, Time: 9-10a, Number of Patients: 11, User Name: [email or web address], Number of Staff: 2  Group topic(s): check in. The group engaged in a general check in by sharing how their week is going and exploring the goos and the bad of the week thus far. Group discussion followed. Leodan was present and participative. Patient shared feeling he was at a 7/8 for the week so far and endorsed being excited about an upcoming meeting with HR to talk through a transfer to a different location.   OLVIN Wilder    Secondary facilitator: Luiz Ferguson, CT  CT supervised by Karena Nguyen, OLVIN-S, [facility]

## 2023-12-14 ENCOUNTER — TELEMEDICINE (OUTPATIENT)
Dept: BEHAVIORAL HEALTH | Facility: CLINIC | Age: 63
End: 2023-12-14
Payer: COMMERCIAL

## 2023-12-14 ENCOUNTER — DOCUMENTATION (OUTPATIENT)
Dept: BEHAVIORAL HEALTH | Facility: CLINIC | Age: 63
End: 2023-12-14

## 2023-12-14 ENCOUNTER — CLINICAL SUPPORT (OUTPATIENT)
Dept: BEHAVIORAL HEALTH | Facility: CLINIC | Age: 63
End: 2023-12-14
Payer: COMMERCIAL

## 2023-12-14 DIAGNOSIS — F33.1 MODERATE EPISODE OF RECURRENT MAJOR DEPRESSIVE DISORDER (MULTI): ICD-10-CM

## 2023-12-14 DIAGNOSIS — F33.2 SEVERE RECURRENT MAJOR DEPRESSION WITHOUT PSYCHOTIC FEATURES (MULTI): Primary | ICD-10-CM

## 2023-12-14 DIAGNOSIS — F41.1 GAD (GENERALIZED ANXIETY DISORDER): Primary | ICD-10-CM

## 2023-12-14 DIAGNOSIS — Z79.899 MEDICATION MANAGEMENT: ICD-10-CM

## 2023-12-14 DIAGNOSIS — F43.21 GRIEF: ICD-10-CM

## 2023-12-14 DIAGNOSIS — F41.1 GAD (GENERALIZED ANXIETY DISORDER): ICD-10-CM

## 2023-12-14 PROCEDURE — 90853 GROUP PSYCHOTHERAPY: CPT | Mod: U2,95

## 2023-12-14 PROCEDURE — 99214 OFFICE O/P EST MOD 30 MIN: CPT | Performed by: NURSE PRACTITIONER

## 2023-12-14 ASSESSMENT — ENCOUNTER SYMPTOMS: CONSTITUTIONAL NEGATIVE: 1

## 2023-12-14 NOTE — GROUP NOTE
Group Topic: Coping Skills   Group Date: 12/12/2023  Start Time: 10:00 AM  End Time: 11:00 AM  Facilitators: Iraida Ball PsyD   Department:  HIEU [facility]    Number of Participants: 10   Group Focus: coping skills  Treatment Modality: Cognitive Behavioral Therapy  Interventions utilized were patient education  Purpose: coping skills      This was a video IOP group on a HIPAA compliant platform. All patients were provided with informed consent.  User Name: [email or web address]  Number of Staff: 2  Group topic: Grounding  Group members spent a few minutes reviewing topic of Values from last week. They received psychoeducation on the differences between avoidance and distraction and explored how they use these strategies at different times. Participants received definition of grounding and explored ways in which they may already be practicing this skill.   Iraida Ball Psy.D.  Secondary facilitator: RONIT العراقي  Supervisor [facility] CARMEN-S, ATR      Name: Leodan Abel YOB: 1960   MR: [id number]      Danny was present and actively listened to discussion.

## 2023-12-14 NOTE — GROUP NOTE
Group Topic: Feeling Awareness/Expression   Group Date: 12/13/2023  Start Time: 10:00 AM  End Time: 11:00 AM  Facilitators: OLVIN Aguila; OLVIN Anderson   Department: [facility]IEU [facility]    Number of Participants: 13   Group Focus: feeling awareness/expression  Treatment Modality: Cognitive Behavioral Therapy and Patient-Centered Therapy  Interventions utilized were exploration and group exercise  Purpose: feelings and insight or knowledge    Name: Leodan Abel YOB: 1960   MR: [id number]    This was a video IOP group on a HIPAA compliant platform. All patients were provided with informed consent.     Date:11/13/2023, Time:10:00a-11:00a, Number of Patients: 13 , User Name: [email or web address],  Number of Staff: 2    Group topic(s):  Expression of thoughts and feelings about mental health conditions and symptoms.   Group members took time to share how they feel about having mental health issues. Group members were prompted to write a letter to their mental health condition or symptoms of choice. Group members then took time to read the letters and process content.     Leodan was present on camera. He was observed writing during the group exercise and listening as others shared their letters. He did not volunteer to read his to the group.     Provider Signature: JEFF Hernández, LILIYA  Secondary facilitator(s): RONIT العراقي  Supervisor JEFF Hernández, [facility]

## 2023-12-14 NOTE — PROGRESS NOTES
"HPI  Danny Abel \"Leodan\" is a 63 y.o. male patient with a chief complaint of   Chief Complaint   Patient presents with    Anxiety    Depression    Med Management    presenting to outpatient treatment for a scheduled psych intensive outpatient psychiatric med check.    NOTE: Symptom scale is rated where 0 = no symptoms at all, and 10 = symptoms so severe that pt is an imminent danger to themselves or others and requires hospitalization.    Anxiety and Depression remain(s) present more days than not, which has improved over the past few weeks. Danny Abel rates the severity of psych symptoms as a 4/10 (last rated 5/10), noting symptom improvement with watching TV, and worsening of symptoms with grief re: death of wife (Elin) in ~2013 and work-related stressors.    -Mood: \"I had some things happen over the weekend. Saturday, I couldn't get warm. I found out, I went to my family doctor - I ate a bunch of cookies,\" PCP suspected hyperglycemia. During this time was nauseated, having insomnia (taking an hour longer to fall asleep from baseline). Reports these symptoms are slowly regulating.      Per hx: started bupropion XL 12/02/2023.   -Sleep/Energy/Motivation: see above - otherwise, denies issues/changes since last visit.      Per hx: noting sleeping a little less since starting bupropion but finds the clonazepam helpful to compensate. \"I like to sleep a lot - that's when I feel most at peace/comfortable.\" Denies issues falling/staying asleep - finds clonazepam 0.75mg helpful for sleep. Dx with [facility] in 2008 (2-3 sleep studies), \"I couldn't do the CPAP - I tried everything.\"  -Appetite/Weight Changes: see above - otherwise, denies issues/changes since last visit.   -Psychosis: denies issues/changes since last visit.   -SI/HI: denies issues/changes since last visit.       HISTORY  -Psych Hx: Dr. Bentley Larsen (psychologist - ); Dr. Ace Kat (psychiatrist - ); hx of multiple providers and " "therapists in various states. Attended [facility] in 2001 s/p end of 1st marriage.  -Psych Med Hx: gabapentin (for neuropathy - felt really depressed/tired, even on 100mg/day); sertraline (on it ~20 yrs, historically did well on 150mg/day, switched to Cymbalta to try to help with both psych symptoms and neuropathy); prazosin (\"I was so zoned out/tired\"); lamotrigine (\"that was horrible,\" increased SI); bupropion (first antidepressant pt tried in 2001, \"I got crying jags, but I still have it - and that was 21 years ago\"); oxcarbazepine and risperidone (\"that wasn't good\"); nortriptyline 80mg (under care of psychiatrist, \"it was giving me dry mouth,\" but found it helpful for depression).  -Substance Use Hx: denies illicit substance use.  -Tobacco: endorses \"occasional use,\" denies currently using.  -Family Psych Hx: mother (anxiety/depression); father (depression)  -Family Substance Abuse Hx: father (EtOH).  -Supports: endorses lack of social support (has moved many times since death of wife in ~2013).  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  -Education: [employer]  -The past, family, and social history has been reviewed and there are no findings pertinent to the chief complaint.       REVIEW OF SYSTEMS  Review of Systems   Constitutional: Negative.    All other systems reviewed and are negative.  Objective   Physical Exam  Psychiatric:         Attention and Perception: Attention and perception normal.         Mood and Affect: Mood is anxious and depressed. Affect is flat.         Speech: Speech normal.         Behavior: Behavior normal. Behavior is cooperative.         Thought Content: Thought content normal.         Cognition and Memory: Cognition and memory normal.         Judgment: Judgment normal.         MEDICAL-DECISION MAKING  Mood-wise reporting some fluctuations in context of hyperglycemic episode and associated symptoms - saw PCP who identified issue - now these " issues are resolving. Overall does feel the bupropion is helping his mood, so mutually agreed to keep things as is.      As previously mentioned, once re-stabilized on psych meds, consider cross-titrating from duloxetine to sertraline. Will increase visit frequency to q1week until more stable/side effects resolve.     Psych med regimen as follows:   1. Duloxetine 60mg/day  2. Buspirone 30mg BID  3. Bupropion XL 150mg QAM  4. Clonazepam 0.75mg (0.5mg x1.5)/day-PRN (OARRS shows prescribed by Caden Augustin, DO up to 0.5mg BID, 30-day supply last filled 11/28/2023)    IMPRESSION  -SANDOR  -MDD, recurrent, moderate - active  -Grief      PLAN  -Continue Medications as directed.  -Follow-up with this provider in 1 week in [facility].  -Risks/benefits/assessment of medication interventions discussed with pt; pt agreeable to plan. Will continue to monitor for symptoms mgmt and SEs and adjust plan as needed.  -MI to increase coping skills/behavior regulation.  -Safety plan reviewed.  -Call  Psychiatry at [phone] with issues.  -For [facility] residents, Mobile Crisis is a 24/7 hotline you can call for assistance at [phone]. Please call 911 or go to your closest Emergency Room if you feel worse. This includes thoughts of hurting yourself or anyone else, or having other troubles such as hearing voices, seeing visions, or having new and scary thoughts about the people around you.

## 2023-12-14 NOTE — GROUP NOTE
Group Topic: Feeling Awareness/Expression   Group Date: 12/13/2023  Start Time: 11:00 AM  End Time: 12:00 PM  Facilitators: OLVIN Aguila; OLVIN Anderson   Department: [facility] H[facility]U [facility]    Number of Participants: 13   Group Focus: feeling awareness/expression  Treatment Modality: Cognitive Behavioral Therapy, Patient-Centered Therapy, and Other: Narrative  Interventions utilized were exploration, group exercise, and support  Purpose: feelings and self-worth    Name: Leodan Abel YOB: 1960   MR: [id number]    This was a video IOP group on a HIPAA compliant platform. All patients were provided with informed consent.     Date:12/13/2023, Time:11:00a-12:00pm , Number of Patients: 13, User Name: [email or web address],  Number of Staff: 2    Group Topic: Mental health diagnosis processing, separation from self and self-esteem building.    Group members continued with the exercise by writing a second letter to themselves. They were given the prompt to write to themselves in a kind, gentle way and to focus on their strengths, resilience and capabilities. Group members then processed their letters and supported one another.     Leodan was present on camera and observed engaged in the writing activity. He listened as others shared their letters out loud. He did not volunteer to read his letter to the group.     Provider Signature: JEFF Hernández, LILIYA  Secondary facilitator(s): RONIT العراقي  Supervisor JEFF Hernández, [facility]

## 2023-12-14 NOTE — GROUP NOTE
Group Topic: Coping Skills   Group Date: 12/14/2023  Start Time: 10:00 AM  End Time: 11:00 AM  Facilitators: OLVIN Anderson; OLVIN Aguila   [facility]    This was a video IOP group on a HIPAA compliant program. All patients were provided with informed consent.     Date: 12/14/2023, Time: 10-11a, Number of Patients: 14, Username: [email or web address], Number of Staff: 2  Group Topic(s): coping styles. The group continued conversation from last week on coping skills and reviewed the difference between healthy coping and maladaptive coping. As a whole, patients brainstormed coping skills falling into five categories including self-soothing, distractions, opposite action, emotional awareness, and mindfulness.     Name: Leodan Abel YOB: 1960   MR: [id number]      Leodan was on topic and present. Patient followed along fully and appropriately. Patient shared examples of meditation of healthy coping skills.   OLVIN Wilder    Secondary facilitator: Luiz Ferguson, CT  CT supervised by OLVIN Hernández-S, [facility]

## 2023-12-14 NOTE — GROUP NOTE
Group Topic: Coping Skills   Group Date: 12/14/2023  Start Time: 11:00 AM  End Time: 12:00 PM  Facilitators: OLVIN Anderson; OLVIN Aguila   [facility]    This was a video IOP group on a HIPAA compliant platform. All patients were provided with informed consent.     Date: 12/14/2023, Time: 11a-12p, Number of Patients: 14, User Name: [email or web address],  Number of Staff: 2  Group topic(s): coping plans & cards. The group continued conversation on coping and patients then engaged in creating coping cards which allowed them to identify stressors and/or symptoms they experience. On the other side of their card they identified a coping plan. Group discussion followed.      Name: Leodan Abel YOB: 1960   MR: [id number]      Leodan was present and attentive. Patient remained quiet and reserved yet fully engaged in exercise. Patient followed along fully and appropriately.   OLVIN Wilder    Secondary facilitator: Luiz Ferguson, CT  CT supervised by OLVIN Hernández-S, [facility]

## 2023-12-14 NOTE — GROUP NOTE
Group Topic: Coping Skills   Group Date: 12/12/2023  Start Time: 11:00 AM  End Time: 12:00 PM  Facilitators: Iraida Ball PsyD   Department:  HIEU [facility]    Number of Participants: 10   Group Focus: coping skills  Treatment Modality: Cognitive Behavioral Therapy  Interventions utilized were patient education  Purpose: coping skills    This was a video IOP group on a HIPAA compliant platform. All patients were provided with informed consent.  User Name: [email or web address]  Number of Staff: 2  Group topic: Self-care   Group members received psychoeducation on the three types of grounding (I.e., mental, physical and soothing grounding). They practiced each type of grounding and set intention to practice three different skills over the next week.   Iraida Ball Psy.D.  Secondary facilitator: RONIT العراقي  Supervisor Shania Narayan [facility] CARMEN-S, ATR      Name: Leodan Abel YOB: 1960   MR: [id number]      Danny was present and actively engaged in discussion. He stated at physical grounding seemed to be most useful for tension release.

## 2023-12-14 NOTE — GROUP NOTE
Group Topic: Goals   Group Date: 12/14/2023  Start Time:  9:00 AM  End Time: 10:00 AM  Facilitators: OLVIN Aguila; OLVIN Anderson   Department: [facility]    Number of Participants: 13   Group Focus: activities of daily living skills, check in, daily focus, and goals  Treatment Modality: Behavior Modification Therapy, Cognitive Behavioral Therapy, and Patient-Centered Therapy  Interventions utilized were assignment, problem solving, and support  Purpose: coping skills, communication skills, self-care, relapse prevention strategies, and trigger / craving management    Name: Leodan Abel YOB: 1960   MR: [id number]    This was a video IOP group on a HIPAA compliant platform. All patients were provided with informed consent.   Date: 12/14/2023, Time: 9-10a, Number of Patients: 13, User Name: [email or web address] Orourke ATR, Number of Staff: 2  Group topic(s): SMART goal setting group today. Group members identified achievement goals, enjoyment goals, and social connection goals - as well as anticipated obstacles and coping skills. Prior to writing and discussing goals, the group spent some time identifying and sharing their emotions using a feeling wheel.   Leodan set goals to grocery shop, clean his apartment, attend a job interview, call some people, and watch football. His main obstacle is worry about how his interview will go. He plans to be positive, use deep breathing, and watch a TV show as coping skills. Leodan acknowledged that he has made progress in the past few weeks, leading him to a job interview.     Primary Facilitator: RONIT العراقي  Supervised by JEFF Hernández, [facility]  Secondary Facilitator: JEFF Hernández, [facility]

## 2023-12-14 NOTE — PROGRESS NOTES
INTENSIVE OUTPATIENT PROGRAM MULTIDISCIPLINARY  TREATMENT PLAN & PROGRESS NOTE     Patient: Danny Abel  : 1960  Age: 63     Student: No  Treatment Team Date: 23  MRN#: [id number]    Attending Physician: Dr. BENTON Lopez MD  Date of IOP Admission: 23   Ref by: Dr. Larsen  Week: 3                       Admission Scores:   DASS21: 43 ANN: 25    BDI:  45 PHQ-9: 20 MAST: 0 DAST: 0 MDQ: Negative      Current Risk Assessment:  Suicidal Risk:  None:      Ideation: X       Plan:      Intent:      SI Plan with plan contracts for safety:  Hx of harming self:        Homicidal Risk:  None: X     Ideation:       Plan:      Intent:      HI Plan with means:  Hx of harming others:          Global Improvement  Clinical Global Impressions Rating Scale Of Illness:  6  1-No Illness Present   2-Minimal   3-Mild   4-Moderate   5-Marked   6-Severe X    7-Very Severe    Diagnoses & ICD-10 Codes  Primary Diagnosis & Code: Major Depressive Disorder, Recurrent, Severe; F33.2  Secondary Diagnosis & Code: Generalized Anxiety Disorder; F41.1     Psychosocial & Environmental Stressors:   1. Dissatisfaction with work   2. Lack of social support   3. Grief/loss    Patient's Treatment Goals:  Date Initiated:           Progress Update:               1.  Attend and actively participate in IOP _4_ days/week for 3 hours per day  2023   Good X    Fair       Poor  Unclear    2.  Attend weekly medication management sessions and maintain compliance of medications  2023     Good X    Fair       Poor  Unclear                                                    3.  Identify symptoms of depression and anxiety while developing coping plans  2023   Good        Fair X   Poor  Unclear                                4.  Increase self-esteem and self-worth while increasing social connections   2023  Good        Fair X   Poor  Unclear      Current medications:  See EMR chart       Progress note:  __New to the IOP program,  attending as planned  _X_Compliant, Progressing & Improving - Needs more time in IOP therapy program   __Compliant, Progressing & Improving Planning Discharge   __Compliant, Not Progressing or Improving: Reason  __Non-Compliant, not progressing or improving: Plan  __Other:    Insurance & Benefits: [facility], IN NETWORK, BENEFITS ARE BASED ON MEDICAL NECESSITY ONLY: Unlimited visits, covers 100% of the contracted rate after deductible has been met.     Co-Pay per day: $0    DEDUCTIBLE        Single: / Family: / Accumulation:  Single: /  Family: /   CO- INSURANCE  Single: / Family: / Accumulation:  Single: /  Family: /    OUT OF POCKET  Single: 1,600  Family: / Accumulation: Single: 1,600 Family: /      Total IOP Sessions completed & authorized to date:  10    Discharge plans have been discussed with patient & medication management provider on:   Reason for discharge:    ___Successfully completed IOP treatment:   ___Pt. decided to seek treatment elsewhere:   ___Dropped out or no show more than three times:  ___Benefits exhausted:   ___Other:    Discharge date:                     Discharge Prognosis: Excellent      Good     Fair     Poor    Follow up appointment with: Physician:   Follow up appointment with: Therapist:    Follow up Aftercare group?  Yes   Patient provided with Crisis Hotline phone number for suicide prevention? Yes     Discharge Scores: Not Completed    Emergency Treatment Plan Completed by patient: Yes__  No__          DASS21:   ANN:   BDI:   Treatment plan electronically signed by Treatment Team:   JUAN Nguyen, LPCC-S, LICKEHINDE, MEJIA Somers, OLVIN, JUAN Ball, PsSammi, JUAN Perla, APRN-CNP

## 2023-12-18 ENCOUNTER — CLINICAL SUPPORT (OUTPATIENT)
Dept: BEHAVIORAL HEALTH | Facility: CLINIC | Age: 63
End: 2023-12-18
Payer: COMMERCIAL

## 2023-12-18 DIAGNOSIS — F41.1 GAD (GENERALIZED ANXIETY DISORDER): ICD-10-CM

## 2023-12-18 DIAGNOSIS — F33.2 SEVERE RECURRENT MAJOR DEPRESSION WITHOUT PSYCHOTIC FEATURES (MULTI): ICD-10-CM

## 2023-12-18 PROCEDURE — 90853 GROUP PSYCHOTHERAPY: CPT | Mod: U2,95

## 2023-12-18 NOTE — GROUP NOTE
Group Topic: Goals   Group Date: 12/18/2023  Start Time:  9:00 AM  End Time: 10:00 AM  Facilitators: OLVIN Aguila; OLVIN Anderson   [facility]    Number of Participants: 10   Group Focus: activities of daily living skills, check in, coping skills, and goals  Treatment Modality: Behavior Modification Therapy, Cognitive Behavioral Therapy, and Patient-Centered Therapy  Interventions utilized were exploration, problem solving, and support  Purpose: coping skills, self-care, relapse prevention strategies, and trigger / craving management    Name: Leodan Abel YOB: 1960   MR: [id number]    This was a video IOP group on a HIPAA compliant platform. All patients were provided with informed consent.   Date: 12/18/2023  Time: 9-10a, Number of Patients: 10, User Name: [email or web address], Number of Staff: 2    Group topic(s): Weekend SMART goal review    Group members took turns sharing progress and challenges they faced this weekend related to their identified SMART goals.     Leodan was present on camera and appeared attentive. He shared his interview last week for a 1st shift went well and he has a 2nd interview tomorrow. He also shared shopping cleaning, doing laundry and watching football this past weekend. He shared for as long as he can remember he has had difficulties sleeping especially on Sunday evenings as he is thinking about the week ahead.     Primary Facilitator BRIAN HernándzeS, [facility]  Secondary Facilitator Luiz Ferguson, CT  Supervisor OLVIN Hernández-S, [facility]

## 2023-12-19 ENCOUNTER — CLINICAL SUPPORT (OUTPATIENT)
Dept: BEHAVIORAL HEALTH | Facility: CLINIC | Age: 63
End: 2023-12-19
Payer: COMMERCIAL

## 2023-12-19 DIAGNOSIS — F33.2 SEVERE RECURRENT MAJOR DEPRESSION WITHOUT PSYCHOTIC FEATURES (MULTI): ICD-10-CM

## 2023-12-19 DIAGNOSIS — F41.1 GAD (GENERALIZED ANXIETY DISORDER): ICD-10-CM

## 2023-12-19 PROCEDURE — 90853 GROUP PSYCHOTHERAPY: CPT | Mod: U2,95

## 2023-12-19 NOTE — GROUP NOTE
Group Topic: Feeling Awareness/Expression   Group Date: 12/19/2023  Start Time:  9:00 AM  End Time: 10:00 AM  Facilitators: OLVIN Aguila; Iraida Ball PsyD   [facility]    Number of Participants: 10   Group Focus: affirmation, feeling awareness/expression, and loss/grief issues  Treatment Modality: Cognitive Behavioral Therapy, Patient-Centered Therapy, and Psychoeducation  Interventions utilized were patient education and support  Purpose: feelings, insight or knowledge, and self-care    Name: Leodan Abel YOB: 1960   MR: [id number]    This session was conducted via HIPAA compliant video. Patient was provided with informed consent.    Date: 12/19/23  Time: 9:00 am to 10:00 am  Group Members: 10  Number of Staff: 2  [email or web address]    Group Topic: Psychoeducation of grief and loss.  Group members learned of Елена Bird's 1969 book On Death & Dying and the stages of grief. The group identified and defined types of losses one may experience including death, the loss of jobs, relationships, lifestyles, dreams, etc. Group members feelings and experiences were explored and validated. Members were encouraged to reflect on possible places where they are stuck in the grief process and consider self-help resources outside of group as well as ongoing grief therapy to move through grief with more ease and guidance.     Leodan was present on camera. He was attentive and engaged during the session. He shared how the loss of his wife 10 years ago to H1N1 followed by the loss of his cat in 2019 have been extremely difficult. He discussed how memories, anniversaries and holidays trigger his grief. He also feels pressured by loved ones to 'get over it' by now. Randolph' experiences and feelings were validated. He was encouraged to seek ongoing individual grief therapy once he completes IOP and reflect on whether or not he is getting stuck in the grief process in  some ways.     Primary Facilitator - JEFF Hernández, [facility]  Secondary Faciliator - RONIT العراقي  Supervisor JEFF Hernández, [facility]

## 2023-12-20 ENCOUNTER — CLINICAL SUPPORT (OUTPATIENT)
Dept: BEHAVIORAL HEALTH | Facility: CLINIC | Age: 63
End: 2023-12-20
Payer: COMMERCIAL

## 2023-12-20 DIAGNOSIS — F41.1 GAD (GENERALIZED ANXIETY DISORDER): ICD-10-CM

## 2023-12-20 DIAGNOSIS — F33.2 SEVERE RECURRENT MAJOR DEPRESSION WITHOUT PSYCHOTIC FEATURES (MULTI): ICD-10-CM

## 2023-12-20 PROCEDURE — 90853 GROUP PSYCHOTHERAPY: CPT | Mod: U2,95

## 2023-12-20 NOTE — GROUP NOTE
Group Topic: Feeling Awareness/Expression   Group Date: 12/20/2023  Start Time:  9:00 AM  End Time: 10:00 AM  Facilitators: OLVIN Anderson; OLVIN Aguila   [facility]    This was a video IOP group on a HIPAA compliant platform. All patients were provided with informed consent.     Date: 12/20/2023, Time: 9-10a, Number of Patients: 10, User Name: [email or web address],  Number of Staff: 2  Group topic(s): dino, thorn, bud reflection. The group engaged in exploring a reflection of their week thus far by identifying their dino (a positive, a highlight or a success), thorn (a challenge or something you can use more support with) and a bud (new idea or something you're looking forward to knowing or understanding more). Group discussion followed.      Name: Leodan Abel YOB: 1960   MR: [id number]      Leodan was on topic and participative. Patient engaged fully and followed along appropriately. Patient explored his reflection including identifying a dino of his week as having an interview that went well with a potential transfer location, the thorn of his week as some worries around work learning why he is on leave and a bud as getting to see his therapist, Britt.   OLVIN Wilder    Secondary facilitator: RONIT العراقي  CT supervised by OLVIN Hernández-S, [facility]

## 2023-12-20 NOTE — GROUP NOTE
Group Topic: Leisure Skills   Group Date: 12/18/2023  Start Time: 10:00 AM  End Time: 11:00 AM  Facilitators: OLVIN Anderson; OLVIN Aguila   Department: [facility]        Name: Leodan Abel YOB: 1960   MR: [id number]      This was a video IOP group on a HIPAA compliant platform. All patients were provided with informed consent.   Date: 12/18/2023, Time: 10-11a, Number of Patients: 12, User Name: JEFF Orourke, ATR, Number of Staff: 2  Group topic(s): Today's group topic was Sleep Hygiene. During the first hour of group, some group members shared their SMART goals that were set on Thursday last week. In addition to the SMART goals review, group members engaged in an open conversation around sleep hygiene. We then began reviewing a PDF of common forms of sleep hygiene. Leodan was attentive and engaged during the 10am hour but did not participate.     Primary Facilitator: RONIT العراقي  Supervised by JEFF Hernández, [facility]     Secondary Facilitator: JEFF Hernández, [facility]

## 2023-12-20 NOTE — GROUP NOTE
Group Topic: Coping Skills   Group Date: 12/19/2023  Start Time: 10:00 AM  End Time: 11:00 AM  Facilitators: Iraida Ball PsyD   Department:  [facility]U [facility]    Number of Participants: 11   Group Focus: coping skills  Treatment Modality: Dialectical Behavioral Therapy  Interventions utilized were group exercise and patient education  Purpose: coping skills    This was a video IOP group on a HIPAA compliant platform. All patients were provided with informed consent.  User Name: [email or web address]  Number of Staff: 2  Group topic: Distress Tolerance  Group members reviewed grounding techniques and shared moments from the week in which they practiced grounding. Participants received education on defining distress and crisis. They identified what their behaviors and symptoms look like when they are in crisis.   Iraida Ball Psy.D.  Secondary facilitator: RONIT العراقي  Supervisor Shania Narayan, [facility], ATR      Name: Leodan Abel YOB: 1960   MR: [id number]      Danny was present and actively engaged in discussion. He shared that fear of the unknown and the future often lead to distress. He expressed worries about returning to work.

## 2023-12-20 NOTE — GROUP NOTE
Group Topic: Leisure Skills   Group Date: 12/18/2023  Start Time: 11:00 AM  End Time: 12:00 PM  Facilitators: OLVIN Anderson; OLVIN Aguila   Department: [facility]IEU [facility]        Name: Leodan Abel YOB: 1960   MR: [id number]      This was a video IOP group on a HIPAA compliant platform. All patients were provided with informed consent.   Date: 12/18/2023, Time: 11a-12p, Number of Patients: 12, User Name: JEFF Orourke, ATR, Number of Staff: 2  Group topic(s): Today's group topic was Sleep Hygiene. During the second hour of group, we finished reviewing the common sleep hygiene behaviors PDF. The group then took some time to determine what their biggest obstacle to sleeping is, and any replacement behaviors might be. Lastly, the group completed a DBT chain analysis on their sleep problem, which they later shared. Leodan was attentive and engaged during the 10am hour but did not participate.    Primary Facilitator: Luiz Ferguson CT  Supervised by JEFF Hernández, [facility]     Secondary Facilitator: JEFF Hernández, [facility]

## 2023-12-20 NOTE — GROUP NOTE
Group Topic: Coping Skills   Group Date: 12/19/2023  Start Time: 11:00 AM  End Time: 12:00 PM  Facilitators: Iraida Ball PsyD   Department:  HIEU [facility]    Number of Participants: 11   Group Focus: coping skills  Treatment Modality: Dialectical Behavioral Therapy  Interventions utilized were group exercise and patient education  Purpose: coping skills    This was a video IOP group on a HIPAA compliant platform. All patients were provided with informed consent.  User Name: [email or web address]  Number of Staff: 2  Group topic: Distress Tolerance  Group members received psychoeducation on various distress tolerance skills from DBT. They explored symptoms that these skills may be useful for and discussed the skills in further detail (I.e., provided examples of ways to use the skills).   Iraida Ball Psy.D.  Secondary facilitator: RONIT العراقي  Supervisor Shania Narayan, [facility]-S, ATR      Name: Leodan Abel YOB: 1960   MR: [id number]      Danny was present and actively engaged in discussion. He stated that he finds he can cope through anxiety initially with problem solving but that it later returns.

## 2023-12-21 ENCOUNTER — TELEMEDICINE (OUTPATIENT)
Dept: BEHAVIORAL HEALTH | Facility: CLINIC | Age: 63
End: 2023-12-21
Payer: COMMERCIAL

## 2023-12-21 ENCOUNTER — CLINICAL SUPPORT (OUTPATIENT)
Dept: BEHAVIORAL HEALTH | Facility: CLINIC | Age: 63
End: 2023-12-21
Payer: COMMERCIAL

## 2023-12-21 DIAGNOSIS — F41.1 GAD (GENERALIZED ANXIETY DISORDER): Primary | ICD-10-CM

## 2023-12-21 DIAGNOSIS — F33.1 MODERATE EPISODE OF RECURRENT MAJOR DEPRESSIVE DISORDER (MULTI): ICD-10-CM

## 2023-12-21 DIAGNOSIS — Z79.899 MEDICATION MANAGEMENT: ICD-10-CM

## 2023-12-21 DIAGNOSIS — F33.2 SEVERE RECURRENT MAJOR DEPRESSION WITHOUT PSYCHOTIC FEATURES (MULTI): ICD-10-CM

## 2023-12-21 DIAGNOSIS — F41.1 GAD (GENERALIZED ANXIETY DISORDER): ICD-10-CM

## 2023-12-21 DIAGNOSIS — G47.00 INSOMNIA, UNSPECIFIED TYPE: ICD-10-CM

## 2023-12-21 DIAGNOSIS — F43.21 GRIEF: ICD-10-CM

## 2023-12-21 PROCEDURE — 90853 GROUP PSYCHOTHERAPY: CPT | Mod: U2,95

## 2023-12-21 PROCEDURE — 99213 OFFICE O/P EST LOW 20 MIN: CPT | Performed by: NURSE PRACTITIONER

## 2023-12-21 ASSESSMENT — ENCOUNTER SYMPTOMS
NAUSEA: 1
CONSTITUTIONAL NEGATIVE: 1

## 2023-12-21 NOTE — GROUP NOTE
Group Topic: Goals   Group Date: 12/21/2023  Start Time:  9:00 AM  End Time: 10:00 AM  Facilitators: OLVIN Anderson; OLVIN Aguila   Department: [facility]U [facility]        Name: Leodan Abel YOB: 1960   MR: [id number]      This was a video IOP group on a HIPAA compliant platform. All patients were provided with informed consent.   Date: 12/21/2023, Time: 9-10a, Number of Patients: 12, User Name: JEFF Orourke, ATR, Number of Staff: 2  Group topic(s): SMART goal setting group today. Group members identified achievement goals, enjoyment goals, and social connection goals - as well as anticipated obstacles and coping skills. Prior to writing and discussing goals, the group spent some time identifying and sharing their emotions using a feeling wheel. Leodan set goals to shop, clean, buy Belle gifts, do laundry, spend time with his cat on [email or web address] ibeth, go to his friend's house for Belle, and watch football. He anticipates obstacles of a low mood and anxiety. Leodan plans to use a positive mindset, deep breathing, and napping as coping mechanisms.     Primary Facilitator: RONIT العراقي  Supervised by JEFF Hernández, [facility]     Secondary Facilitator: JEFF Hernández, [facility]

## 2023-12-21 NOTE — GROUP NOTE
Group Topic: Feeling Awareness/Expression   Group Date: 12/21/2023  Start Time: 10:00 AM  End Time: 11:00 AM  Facilitators: OLVIN Anderson; OLVIN Aguila   [facility]    This was a video IOP group on a HIPAA compliant program. All patients were provided with informed consent.     Date: 12/21/2023, Time: 10-11a, Number of Patients: 12, Username: [email or web address], Number of Staff: 2  Group Topic(s): symptom identification. The group explored the definition of symptoms and how symptoms can present themselves physically, mentally, and emotionally. The group worked through brainstorming mild, moderate, and severe depressive symptoms they experienced. Anxiety and manic symptoms will be discussed in later group.      Name: Leodan Abel YOB: 1960   MR: [id number]      Leodan was on topic and attentive. Patient participated fully and appropriately. Patient shared examples of depressive symptoms including isolation and anger.   OLVIN Wilder    Secondary facilitator: Luiz Ferguson, CT   CT supervised by Karena Nguyen, OLVIN-[facility], [facility]

## 2023-12-21 NOTE — PROGRESS NOTES
"HPI  Danny Abel \"Leodan\" is a 63 y.o. male patient with a chief complaint of   Chief Complaint   Patient presents with    Anxiety    Depression    Sleeping Problem    Med Management    presenting to outpatient treatment for a scheduled psych intensive outpatient psychiatric med check.    NOTE: Symptom scale is rated where 0 = no symptoms at all, and 10 = symptoms so severe that pt is an imminent danger to themselves or others and requires hospitalization.    Anxiety and Depression remain(s) present more days than not, which has improved over the past few weeks. Danny Abel rates the severity of psych symptoms as a 3/10 (last rated 4/10), noting symptom improvement with watching TV, and worsening of symptoms with grief re: death of wife (Elin) in ~2013 and work-related stressors.    -Mood: \"pretty good. Still getting a little nauseated in the morning.\" Has a ginger ale after an hour \"and that seems to settle it down.\" Overall feels mood has improved now that he was hired at a new position.      Per hx: started bupropion XL 12/02/2023.   -Sleep/Energy/Motivation: feeling tired today but otherwise, denies issues/changes since last visit.      Per hx: noting sleeping a little less since starting bupropion but finds the clonazepam helpful to compensate. \"I like to sleep a lot - that's when I feel most at peace/comfortable.\" Denies issues falling/staying asleep - finds clonazepam 0.75mg helpful for sleep. Dx with SHAWNA in 2008 (2-3 sleep studies), \"I couldn't do the CPAP - I tried everything.\"  -Appetite/Weight Changes: see above - otherwise, denies issues/changes since last visit.   -Psychosis: denies issues/changes since last visit.   -SI/HI: denies issues/changes since last visit.       HISTORY  -Psych Hx: Dr. Bentley Larsen (psychologist - ); Dr. Ace Kat (psychiatrist - ); hx of multiple providers and therapists in various states. Attended IOP in 2001 s/p end of 1st marriage.  -Psych Med Hx: " "gabapentin (for neuropathy - felt really depressed/tired, even on 100mg/day); sertraline (on it ~20 yrs, historically did well on 150mg/day, switched to Cymbalta to try to help with both psych symptoms and neuropathy); prazosin (\"I was so zoned out/tired\"); lamotrigine (\"that was horrible,\" increased SI); bupropion (first antidepressant pt tried in 2001, \"I got crying jags, but I still have it - and that was 21 years ago\"); oxcarbazepine and risperidone (\"that wasn't good\"); nortriptyline 80mg (under care of psychiatrist, \"it was giving me dry mouth,\" but found it helpful for depression).  -Substance Use Hx: denies illicit substance use.  -Tobacco: endorses \"occasional use,\" denies currently using.  -Family Psych Hx: mother (anxiety/depression); father (depression)  -Family Substance Abuse Hx: father (EtOH).  -Supports: endorses lack of social support (has moved many times since death of wife in ~2013).  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  -Education: [employer]  -The past, family, and social history has been reviewed and there are no findings pertinent to the chief complaint.       REVIEW OF SYSTEMS  Review of Systems   Constitutional: Negative.    Gastrointestinal:  Positive for nausea.   All other systems reviewed and are negative.    Objective   Physical Exam  Psychiatric:         Attention and Perception: Attention and perception normal.         Mood and Affect: Mood is anxious and depressed. Affect is flat.         Speech: Speech normal.         Behavior: Behavior normal. Behavior is cooperative.         Thought Content: Thought content normal.         Cognition and Memory: Cognition and memory normal.         Judgment: Judgment normal.         MEDICAL-DECISION MAKING  Mood-wise showing improvement since getting a new job (which is day shift) and resolving nausea that started after a hyperglycemic event a couple weeks ago, although reports the nausea " continues to be an issue. No longer napping during the day and waking up easily around 7am. Given pt stability, mutually agreed to keep psych med regimen as is - hopeful the nausea will continue to improve over time.      Psych med regimen as follows:   1. Duloxetine DR 60mg/day  2. Buspirone 30mg BID  3. Bupropion XL 150mg QAM  4. Clonazepam 0.75mg (0.5mg x1.5)/day-PRN (OARRS shows prescribed by Caden Augustin, DO up to 0.5mg BID, 30-day supply last filled 11/28/2023)     As previously mentioned, could consider cross-titrating from duloxetine to sertraline, but only if current med regimen isn't working well.     IMPRESSION  -SANDOR  -MDD, recurrent, moderate - active  -Grief  -Insomnia disorder, unspecified (rule out secondary to anxiety vs. secondary to underlying medical condition - SHAWNA)      PLAN  -Continue Medications as directed.  -Follow-up with this provider in 2 weeks in [facility].  -Risks/benefits/assessment of medication interventions discussed with pt; pt agreeable to plan. Will continue to monitor for symptoms mgmt and SEs and adjust plan as needed.  -MI to increase coping skills/behavior regulation.  -Safety plan reviewed.  -Call  Psychiatry at [phone] with issues.  -For [facility] residents, [employer] is a 24/7 hotline you can call for assistance at [phone] [phone]. Please call 911 or go to your closest Emergency Room if you feel worse. This includes thoughts of hurting yourself or anyone else, or having other troubles such as hearing voices, seeing visions, or having new and scary thoughts about the people around you.

## 2023-12-21 NOTE — GROUP NOTE
Group Topic: Feeling Awareness/Expression   Group Date: 12/21/2023  Start Time: 11:00 AM  End Time: 12:00 PM  Facilitators: OLVIN Anderson; OLVIN Aguila   [facility]    This was a video IOP group on a HIPAA compliant program. All patients were provided with informed consent.     Date: 12/21/23, Time: 11a-12p, Number of Patients: 12, Username: [email or web address], Number of Staff: 2  Group Topic(s): symptom identification. The group explored the definition of symptoms and how symptoms can present themselves physically, mentally, and emotionally. The group worked through brainstorming mild, moderate, and severe symptoms of anxiety and kandace.      Name: Leodan Abel YOB: 1960   MR: [id number]      Leodan was on topic and participative. Patient engaged fully and appropriately. Patient identified examples of anxiety symptoms as increased heart rate and difficulty breathing.   OLVIN Wilder    Secondary facilitator: RONIT العراقي  CT supervised by OLVIN Hernández-S, [facility]

## 2023-12-21 NOTE — GROUP NOTE
Group Topic: Coping Skills   Group Date: 12/20/2023  Start Time: 11:00 AM  End Time: 12:00 PM  Facilitators: OLVIN Aguila; OLVIN Anderson   [facility]    Number of Participants: 10   Group Focus: coping skills  Treatment Modality: Cognitive Behavioral Therapy and Patient-Centered Therapy  Interventions utilized were exploration and group exercise  Purpose: coping skills and insight or knowledge    Name: Leodan Abel YOB: 1960   MR: [id number]    This was a video IOP group on a HIPAA compliant platform. All patients were provided with informed consent.     Date:12/20/23, Time:11:00 am - 12:00 pm , Number of Patients: 10, User Name: [email or web address],  Number of Staff: 2  Group topic(s): Protective Factors Continued  Group members were prompted to draw a suit of armor and include their protective factors as part of their armor. Group members then shared their sketches, provided feedback to one another and processed the activity together.   Danny was present on camera and was observed sketching as prompted. He shared pieces of his armor include his cat, hope, lakeisha, good friends, humor, talk therapy, taking his medications and attending IOP.   Provider Signature: JEFF Hernández, LILIYA  Secondary facilitator(s): RONIT العراقي  Supervisor JEFF Hernández, [facility]

## 2023-12-22 ENCOUNTER — DOCUMENTATION (OUTPATIENT)
Dept: BEHAVIORAL HEALTH | Facility: CLINIC | Age: 63
End: 2023-12-22

## 2023-12-22 ENCOUNTER — TELEMEDICINE (OUTPATIENT)
Dept: BEHAVIORAL HEALTH | Facility: CLINIC | Age: 63
End: 2023-12-22
Payer: COMMERCIAL

## 2023-12-22 DIAGNOSIS — F33.1 MODERATE EPISODE OF RECURRENT MAJOR DEPRESSIVE DISORDER (MULTI): ICD-10-CM

## 2023-12-22 PROCEDURE — 90832 PSYTX W PT 30 MINUTES: CPT | Performed by: COUNSELOR

## 2023-12-22 NOTE — PROGRESS NOTES
INTENSIVE OUTPATIENT PROGRAM MULTIDISCIPLINARY  TREATMENT PLAN & PROGRESS NOTE     Patient: Danny Abel  : 1960  Age: 63     Student: No  Treatment Team Date: 23  MRN#: [id number]    Attending Physician: Dr. BENTON Lopez MD  Date of IOP Admission: 23   Ref by: Dr. Larsen  Week: 4                       Admission Scores:   DASS21: 43 ANN: 25    BDI:  45 PHQ-9: 20 MAST: 0 DAST: 0 MDQ: Negative      Current Risk Assessment:  Suicidal Risk:  None:      Ideation: X       Plan:      Intent:      SI Plan with plan contracts for safety:  Hx of harming self:        Homicidal Risk:  None: X     Ideation:       Plan:      Intent:      HI Plan with means:  Hx of harming others:          Global Improvement  Clinical Global Impressions Rating Scale Of Illness:  6  1-No Illness Present   2-Minimal   3-Mild   4-Moderate   5-Marked   6-Severe X    7-Very Severe    Diagnoses & ICD-10 Codes  Primary Diagnosis & Code: Major Depressive Disorder, Recurrent, Severe; F33.2  Secondary Diagnosis & Code: Generalized Anxiety Disorder; F41.1     Psychosocial & Environmental Stressors:   1. Dissatisfaction with work   2. Lack of social support   3. Grief/loss    Patient's Treatment Goals:  Date Initiated:           Progress Update:               1.  Attend and actively participate in IOP _4_ days/week for 3 hours per day  2023   Good X    Fair       Poor  Unclear    2.  Attend weekly medication management sessions and maintain compliance of medications  2023     Good X    Fair       Poor  Unclear                                                    3.  Identify symptoms of depression and anxiety while developing coping plans  2023   Good        Fair X   Poor  Unclear                                4.  Increase self-esteem and self-worth while increasing social connections   2023  Good        Fair X   Poor  Unclear      Current medications:  See EMR chart       Progress note:  __New to the IOP program,  attending as planned  _X_Compliant, Progressing & Improving - Needs more time in IOP therapy program   __Compliant, Progressing & Improving Planning Discharge   __Compliant, Not Progressing or Improving: Reason  __Non-Compliant, not progressing or improving: Plan  __Other:    Insurance & Benefits: [employer] [facility] [employer], IN NETWORK, BENEFITS ARE BASED ON MEDICAL NECESSITY ONLY: Unlimited visits, covers 100% of the contracted rate after deductible has been met.     Co-Pay per day: $0    DEDUCTIBLE        Single: / Family: / Accumulation:  Single: /  Family: /   CO- INSURANCE  Single: / Family: / Accumulation:  Single: /  Family: /    OUT OF POCKET  Single: 1,600  Family: / Accumulation: Single: 1,600 Family: /      Total IOP Sessions completed & authorized to date:  14    Discharge plans have been discussed with patient & medication management provider on:   Reason for discharge:    ___Successfully completed IOP treatment:   ___Pt. decided to seek treatment elsewhere:   ___Dropped out or no show more than three times:  ___Benefits exhausted:   ___Other:    Discharge date:                     Discharge Prognosis: Excellent      Good     Fair     Poor    Follow up appointment with: Physician:   Follow up appointment with: Therapist:    Follow up Aftercare group?  Yes   Patient provided with Crisis Hotline phone number for suicide prevention? Yes     Discharge Scores: Not Completed    Emergency Treatment Plan Completed by patient: Yes__  No__          DASS21:   ANN:   BDI:   Treatment plan electronically signed by Treatment Team:   JUAN Nguyen, LPCC-S, LICKEHINDE, MEJIA Somers, OLVIN, JUAN Ball, PsSammi, JUAN Perla, APRN-CNP

## 2023-12-22 NOTE — PROGRESS NOTES
All Individuals Present: Patient and Provider (Encounter Provider)     ID: Danny Abel is a 63 y.o. male      Pt met with [facility] for follow-up visit for symptoms of depression.    An interactive audio and video telecommunication system which permits real time communications between the patient (at the originating site) and the provider (at the distant site) was utilized to provide this telehealth service. Verbal consent was requested and obtained from Danny Abel on this date 12/22/23 for a telehealth visit.    Pt is meeting with therapist as a part of the Personalized Care for Complex Lives Program.    Mental Status Exam  General Appearance: Well groomed, appropriate eye contact.  Attitude/Behavior: Cooperative, conversant, engaged, and with good eye contact.  Motor: No psychomotor agitation or retardation, no tremor or other abnormal movements.  Speech: Normal rate, volume, prosody  Gait/Station: Within normal limits  Mood: Dysthymic.  Affect: Dysphoric, constricted but reactive and Flat  Thought Process: Linear, goal directed  Thought Associations: No loosening of associations  Thought Content: normal  Sensorium: Alert and oriented to person, place, time and situation  Insight: Intact, as evidenced by Pt discussion  Judgment: Intact      Risk Assessment:  Imminent Risk of Suicide or Serious Self-Injury: None, denied  Imminent Risk of Violence or Homicide: None, denied    Progress Note:  Pt stated that he is continuing IOP at this time. He noted that it has been helpful for him to learn new skills. He reported that he is going to switch to a day shift position within  in the next few weeks, which he is excited about. Pt shared that he is going to spend the holiday with his friends, which he did last year and was a positive experience. [facility] encouraged Pt to think about something he can do to honor his wife in the next few weeks as the anniversary of her passing nears. Pt stated that he hasn't done  something like that before, but that he might think about what he can do this year.    Start Time: 1:04 pm  End Time: 1:34 pm  CPT Code: 43745    Attestation Statements   Number of minutes spent performing psychotherapy: 30

## 2023-12-26 ENCOUNTER — CLINICAL SUPPORT (OUTPATIENT)
Dept: BEHAVIORAL HEALTH | Facility: CLINIC | Age: 63
End: 2023-12-26
Payer: COMMERCIAL

## 2023-12-26 DIAGNOSIS — F33.2 SEVERE RECURRENT MAJOR DEPRESSION WITHOUT PSYCHOTIC FEATURES (MULTI): ICD-10-CM

## 2023-12-26 DIAGNOSIS — F41.1 GAD (GENERALIZED ANXIETY DISORDER): ICD-10-CM

## 2023-12-26 PROCEDURE — 90853 GROUP PSYCHOTHERAPY: CPT | Mod: U2,95

## 2023-12-27 ENCOUNTER — CLINICAL SUPPORT (OUTPATIENT)
Dept: BEHAVIORAL HEALTH | Facility: CLINIC | Age: 63
End: 2023-12-27
Payer: COMMERCIAL

## 2023-12-27 DIAGNOSIS — F33.2 SEVERE RECURRENT MAJOR DEPRESSION WITHOUT PSYCHOTIC FEATURES (MULTI): Primary | ICD-10-CM

## 2023-12-27 DIAGNOSIS — F41.1 GAD (GENERALIZED ANXIETY DISORDER): ICD-10-CM

## 2023-12-27 PROCEDURE — 90853 GROUP PSYCHOTHERAPY: CPT | Mod: U2,95

## 2023-12-27 NOTE — GROUP NOTE
Group Topic: Coping Skills   Group Date: 12/26/2023  Start Time: 10:00 AM  End Time: 11:00 AM  Facilitators: OLVIN Anderson   Department: [facility] HIEU [facility]    This was a video IOP group on a HIPAA compliant program. All patients were provided with informed consent.     Date: 12/26/23, Time: 10-11a, Number of Patients: 11, Username: [email or web address], Number of Staff: 1  Group Topic(s): cognitive distortions. The group engaged in exploring the definition of cognitive distortion. The group then explored examples of cognitive distortions including magnification, minimization, catastrophizing, and overgeneralizing. Group discussion followed.     Name: Leodan Abel YOB: 1960   MR: [id number]      Leodan was on topic and present. Patient shared appropriately and offered examples of cognitive distortions.   OLVIN Wilder

## 2023-12-27 NOTE — GROUP NOTE
Group Topic: Coping Skills   Group Date: 12/26/2023  Start Time: 11:00 AM  End Time: 12:00 PM  Facilitators: OLVIN Anderson   Department: [facility] HIEU [facility]    This was a video IOP group on a HIPAA compliant program. All patients were provided with informed consent.     Date: 12/26/23, Time: 11a-12p, Number of Patients: 9, Username: [email or web address], Number of Staff: 1  Group Topic(s): cognitive distortions. The group engaged in exploring the definition of cognitive distortion. The group then continued exploring examples of cognitive distortions including magical thinking, personalization, jumping to conclusions, mind reading, and fortune telling. Group discussion followed.     Name: Leodan Abel YOB: 1960   MR: [id number]      Leodan was present and attentive. Patient maintained engagement throughout and shared examples of fortune telling and connected it to a self-fulfilling prophecy.   OLVIN Wilder

## 2023-12-27 NOTE — GROUP NOTE
Group Topic: Goals   Group Date: 12/26/2023  Start Time:  9:00 AM  End Time: 10:00 AM  Facilitators: OLVIN Anderson   Department: [facility] HIEU [facility]    This was a video IOP group on a HIPAA compliant program. All patients were provided with informed consent.     Date: 12/26/2023, Time: 9-10a, Number of Patients: 12, Username: [email or web address], Number of Staff: 1  Group Topic(s): goal check-in. the group engaged in following up on goals set before the weekend. Individually, patients shared the goals they set for themselves and expressed how well they felt they worked towards their goals. Group discussion followed.     Name: Leodan Abel YOB: 1960   MR: [id number]      Leodan was present and participative. Patient shared appropriately and checked in from the weekend. Patient endorsed finding out his transfer for work was approved and he will begin a new position following his leave ending. Patient shared he spent Belle Laura with his cat and ordered Chinese food. Patient shared that this day was challenging due to struggle with grief from his late wife. Patient shared on Roby he went to friends and enjoyed his time.   OLVIN Wildre

## 2023-12-28 ENCOUNTER — CLINICAL SUPPORT (OUTPATIENT)
Dept: BEHAVIORAL HEALTH | Facility: CLINIC | Age: 63
End: 2023-12-28
Payer: COMMERCIAL

## 2023-12-28 ENCOUNTER — DOCUMENTATION (OUTPATIENT)
Dept: BEHAVIORAL HEALTH | Facility: CLINIC | Age: 63
End: 2023-12-28
Payer: COMMERCIAL

## 2023-12-28 ENCOUNTER — E-VISIT (OUTPATIENT)
Dept: PRIMARY CARE | Facility: CLINIC | Age: 63
End: 2023-12-28
Payer: COMMERCIAL

## 2023-12-28 DIAGNOSIS — N52.1 ERECTILE DYSFUNCTION DUE TO TYPE 2 DIABETES MELLITUS (MULTI): Primary | ICD-10-CM

## 2023-12-28 DIAGNOSIS — E11.69 ERECTILE DYSFUNCTION DUE TO TYPE 2 DIABETES MELLITUS (MULTI): Primary | ICD-10-CM

## 2023-12-28 DIAGNOSIS — F41.1 GAD (GENERALIZED ANXIETY DISORDER): ICD-10-CM

## 2023-12-28 DIAGNOSIS — F33.2 SEVERE RECURRENT MAJOR DEPRESSION WITHOUT PSYCHOTIC FEATURES (MULTI): ICD-10-CM

## 2023-12-28 PROCEDURE — 90853 GROUP PSYCHOTHERAPY: CPT | Mod: U2,95

## 2023-12-28 NOTE — GROUP NOTE
Group Topic: Self Esteem   Group Date: 12/27/2023  Start Time:  9:00 AM  End Time: 10:00 AM  Facilitators: OLVIN Aguila; OLVIN Anderson   [facility]    Number of Participants: 13   Group Focus: clarity of thought and other perfectionism  Treatment Modality: Cognitive Behavioral Therapy and Patient-Centered Therapy  Interventions utilized were exploration  Purpose: maladaptive thinking, irrational fears, insight or knowledge, and self-worth    Name: Leodan Abel YOB: 1960   MR: [id number]    This session was conducted via HIPAA compliant video. Patient was provided with informed consent.    Date: 12/27/23  Time: 9:00 am to 10:00 am  Group Members: 13  Number of Staff: 1  [email or web address]    Group Topic: Group members began the day with a reading from 'Don't Sweat the Small Stuff' and the topic was 'Repeat after me, Life is not an Emergency'. Group members shared thoughts and takeaways from the reading and then focused on perfectionism.     Patient was present on camera. He appeared attentive and was observed listening. He did not contribute to the discussion.    Primary Facilitator - Karena Nguyen, OLVIN-S, [facility]

## 2023-12-28 NOTE — GROUP NOTE
Group Topic: Problem Solving   Group Date: 12/27/2023  Start Time: 11:00 AM  End Time: 12:00 PM  Facilitators: OLVIN Aguila; OLVIN Anderson   [facility]    Number of Participants: 13   Group Focus: activities of daily living skills, communication, and coping skills  Treatment Modality: Behavior Modification Therapy, Cognitive Behavioral Therapy, and Patient-Centered Therapy  Interventions utilized were exploration and problem solving  Purpose: coping skills, self-worth, self-care, and relapse prevention strategies    Name: Leodan Abel YOB: 1960   MR: [id number]      This was a video IOP group on a HIPAA compliant platform. All patients were provided with informed consent.     Date:12/27/23, Time: 11:00 am-12:00 pm  Number of Patients: 13, User Name:[email or web address],  Number of Staff: 1    Group topic(s): Group members continued the discussion about transitioning out of IOP and back into the workplace successfully. The group was asked to identify and write down things they will get to do and things they are looking forward to doing post IOP.     Patient was present on camera. He was observed writing but did not contribute to the conversation.     Provider Signature: Karena Nguyen, OLVIN-S, [facility]

## 2023-12-28 NOTE — GROUP NOTE
Group Topic: Problem Solving   Group Date: 12/27/2023  Start Time: 10:00 AM  End Time: 11:00 AM  Facilitators: OLVIN Aguila; OLVIN Anderson   [facility]    Number of Participants: 13   Group Focus: problem solving and social skills  Treatment Modality: Behavior Modification Therapy, Cognitive Behavioral Therapy, and Patient-Centered Therapy  Interventions utilized were exploration, problem solving, and support  Purpose: communication skills, insight or knowledge, self-care, and relapse prevention strategies    Name: Leodan Abel YOB: 1960   MR: [id number]    This was a video IOP group on a HIPAA compliant platform. All patients were provided with informed consent.     Date:12/27/23, Time: 10:00 am-11:00 am  Number of Patients: 13, User Name:[email or web address],  Number of Staff: 1    Group topic(s): Group members identified and wrote down their worries and concerns about competing IOP and returning to the workplace and/or school upon discharge.      Patient was present on camera. He did not share his worries or fears but appeared attentive as others processed and problem solved ways to set themselves up pamela successful in various work environments.     Provider Signature: Karena Nguyen, OLVIN-S, [facility]

## 2023-12-28 NOTE — PROGRESS NOTES
IOP staff met with pt. to discuss discharge planning and explore next steps for treatment. Pt. is discharged from [facility] as of Thursday January 11th, 2023. Pt. plans to follow up with his current providers for medication management and individual therapy. Pt. was offered information and education on discharge and [facility] Aftercare programing. Provider will continue to follow up with patient while engaged in [facility].   OLVIN Wilder

## 2023-12-29 ENCOUNTER — DOCUMENTATION (OUTPATIENT)
Dept: BEHAVIORAL HEALTH | Facility: CLINIC | Age: 63
End: 2023-12-29
Payer: COMMERCIAL

## 2023-12-29 RX ORDER — SILDENAFIL 50 MG/1
50 TABLET, FILM COATED ORAL DAILY
Qty: 30 TABLET | Refills: 2 | Status: SHIPPED | OUTPATIENT
Start: 2023-12-29 | End: 2024-03-28

## 2023-12-29 NOTE — GROUP NOTE
Group Topic: Coping Skills   Group Date: 12/28/2023  Start Time: 10:00 AM  End Time: 11:00 AM  Facilitators: OLVIN Anderson   Department: [facility]    This was a video IOP group on a HIPAA compliant program. All patients were provided with informed consent.     Date: 12/28/23, Time: 10-11a, Number of Patients: 12, Username: [email or web address], Number of Staff: 1  Group Topic(s): cognitive distortions. The group engaged in continuing to explore the definition of cognitive distortion. The group then explored examples of cognitive distortions including emotional reasoning, disqualifying the positive, should statements, and all-or-nothing thinking.  Group discussion followed.      Name: Leodan Abel YOB: 1960   MR: [id number]      Leodan was on topic and present. Patient maintained nonverbal engagement throughout group exercise and discussion.   OLVIN Wilder

## 2023-12-29 NOTE — GROUP NOTE
Group Topic: Goals   Group Date: 12/28/2023  Start Time:  9:00 AM  End Time: 10:00 AM  Facilitators: OLVIN Anderson   Department: [facility]    This was a video IOP group on a HIPAA compliant program. All patients were provided with informed consent.     Date: 12/28/23, Time: 9-10am, Number of Patients: 13, Username: [email or web address], Number of Staff: 1  Group Topic(s): goal setting. The group engaged in a review of group topics covered throughout the week. Individually, patients then set goals for the weekend including achievement, enjoyment, and closeness to others. Patients identified anticipated obstacles over the weekend and coping skills to work through these challenges. The group engaged in a discussion of sharing their set goals.      Name: Leodan Abel YOB: 1960   MR: [id number]      Leodan was on topic and present. Patient shared weekend goals of completing lab work, cleaning his cats water bowl, spending time with a friend, and watching football. Patient identified a barrier to his goals this weekend as nausea and plans to use ginger ale and crackers to help. Patient shared the nausea is a side effect to a medication change.   OLVIN Wilder

## 2023-12-29 NOTE — GROUP NOTE
Group Topic: Coping Skills   Group Date: 12/28/2023  Start Time: 11:00 AM  End Time: 12:00 PM  Facilitators: OLVIN Anderson   Department: [facility]    This was a video IOP group on a HIPAA compliant program. All patients were provided with informed consent.     Date: 12/28/23, Time: 11a-12p, Number of Patients: 11, Username: [email or web address], Number of Staff: 1  Group Topic(s): thought records/automatic thoughts. The group engaged in exploring the topic of the cognitive model and the connection of one's thoughts, emotions and behaviors. As a whole, patients then were presented with tools for identifying automatic thoughts and applying them to a thought record. The exercise allowed patients to identify the situation, automatic thoughts, emotions and alternate responses. Alternate responses were challenged by asking evidence for and against, worst/best/most realistic outcomes, effect of the automatic thoughts and allowing for alternate perspectives. Group discussion and practice of the exercise followed.     Name: Leodan Abel YOB: 1960   MR: [id number]      Leodan was present and on topic. Patient engaged fully and appropriately in group exercise.   OLVIN Wilder

## 2023-12-29 NOTE — PROGRESS NOTES
INTENSIVE OUTPATIENT PROGRAM MULTIDISCIPLINARY  TREATMENT PLAN & PROGRESS NOTE     Patient: Danny Abel  : 1960  Age: 63     Student: No  Treatment Team Date: 23  MRN#: [id number]    Attending Physician: Dr. BENTON Lopez MD  Date of IOP Admission: 23   Ref by: Dr. Larsen  Week: 5                     Admission Scores:   DASS21: 43 ANN: 25    BDI:  45 PHQ-9: 20 MAST: 0 DAST: 0 MDQ: Negative      Current Risk Assessment:  Suicidal Risk:  None:      Ideation: X       Plan:      Intent:      SI Plan with plan contracts for safety:  Hx of harming self:        Homicidal Risk:  None: X     Ideation:       Plan:      Intent:      HI Plan with means:  Hx of harming others:          Global Improvement  Clinical Global Impressions Rating Scale Of Illness:  5  1-No Illness Present   2-Minimal   3-Mild   4-Moderate   5-Marked X   6-Severe     7-Very Severe    Diagnoses & ICD-10 Codes  Primary Diagnosis & Code: Major Depressive Disorder, Recurrent, Severe; F33.2  Secondary Diagnosis & Code: Generalized Anxiety Disorder; F41.1     Psychosocial & Environmental Stressors:   1. Dissatisfaction with work   2. Lack of social support   3. Grief/loss    Patient's Treatment Goals:  Date Initiated:           Progress Update:               1.  Attend and actively participate in IOP _4_ days/week for 3 hours per day  2023   Good X    Fair       Poor  Unclear    2.  Attend weekly medication management sessions and maintain compliance of medications  2023     Good X    Fair       Poor  Unclear                                                    3.  Identify symptoms of depression and anxiety while developing coping plans  2023   Good X    Fair       Poor  Unclear                                4.  Increase self-esteem and self-worth while increasing social connections   2023  Good        Fair X   Poor  Unclear      Current medications:  See EMR chart       Progress note:  __New to the IOP program,  attending as planned  _X_Compliant, Progressing & Improving - Needs more time in IOP therapy program   __Compliant, Progressing & Improving Planning Discharge   __Compliant, Not Progressing or Improving: Reason  __Non-Compliant, not progressing or improving: Plan  __Other:    Insurance & Benefits: [facility] [employer], IN NETWORK, BENEFITS ARE BASED ON MEDICAL NECESSITY ONLY: Unlimited visits, covers 100% of the contracted rate after deductible has been met.     Co-Pay per day: $0    DEDUCTIBLE        Single: / Family: / Accumulation:  Single: /  Family: /   CO- INSURANCE  Single: / Family: / Accumulation:  Single: /  Family: /    OUT OF POCKET  Single: 1,600  Family: / Accumulation: Single: 1,600 Family: /      Total IOP Sessions completed & authorized to date:  17    Discharge plans have been discussed with patient & medication management provider on:   Reason for discharge:    ___Successfully completed IOP treatment:   ___Pt. decided to seek treatment elsewhere:   ___Dropped out or no show more than three times:  ___Benefits exhausted:   ___Other:    Discharge date:                     Discharge Prognosis: Excellent      Good     Fair     Poor    Follow up appointment with: Physician:   Follow up appointment with: Therapist:    Follow up Aftercare group?  Yes   Patient provided with Crisis Hotline phone number for suicide prevention? Yes     Discharge Scores: Not Completed    Emergency Treatment Plan Completed by patient: Yes__  No__          DASS21:   ANN:   BDI:   Treatment plan electronically signed by Treatment Team:   JUAN Nguyen, LPCC-S, LICKEHINDE, MEJIA Somers, OLVIN, JUAN Ball, PsSammi, JUAN Perla, APRN-CNP

## 2023-12-30 ENCOUNTER — APPOINTMENT (OUTPATIENT)
Dept: LAB | Facility: LAB | Age: 63
End: 2023-12-30
Payer: COMMERCIAL

## 2023-12-30 ENCOUNTER — LAB (OUTPATIENT)
Dept: LAB | Facility: LAB | Age: 63
End: 2023-12-30
Payer: COMMERCIAL

## 2023-12-30 DIAGNOSIS — Z12.5 SCREENING FOR PROSTATE CANCER: ICD-10-CM

## 2023-12-30 DIAGNOSIS — I10 PRIMARY HYPERTENSION: ICD-10-CM

## 2023-12-30 DIAGNOSIS — E11.36: ICD-10-CM

## 2023-12-30 LAB
ALBUMIN SERPL BCP-MCNC: 4.1 G/DL (ref 3.4–5)
ALP SERPL-CCNC: 114 U/L (ref 33–136)
ALT SERPL W P-5'-P-CCNC: 19 U/L (ref 10–52)
ANION GAP SERPL CALC-SCNC: 9 MMOL/L (ref 10–20)
AST SERPL W P-5'-P-CCNC: 14 U/L (ref 9–39)
BASOPHILS # BLD AUTO: 0.08 X10*3/UL (ref 0–0.1)
BASOPHILS NFR BLD AUTO: 1.2 %
BILIRUB SERPL-MCNC: 0.4 MG/DL (ref 0–1.2)
BUN SERPL-MCNC: 16 MG/DL (ref 6–23)
CALCIUM SERPL-MCNC: 9.1 MG/DL (ref 8.6–10.3)
CHLORIDE SERPL-SCNC: 105 MMOL/L (ref 98–107)
CHOLEST SERPL-MCNC: 156 MG/DL (ref 0–199)
CHOLESTEROL/HDL RATIO: 2.8
CO2 SERPL-SCNC: 31 MMOL/L (ref 21–32)
CREAT SERPL-MCNC: 0.78 MG/DL (ref 0.5–1.3)
EOSINOPHIL # BLD AUTO: 0.53 X10*3/UL (ref 0–0.7)
EOSINOPHIL NFR BLD AUTO: 8.1 %
ERYTHROCYTE [DISTWIDTH] IN BLOOD BY AUTOMATED COUNT: 15.8 % (ref 11.5–14.5)
GFR SERPL CREATININE-BSD FRML MDRD: >90 ML/MIN/1.73M*2
GLUCOSE SERPL-MCNC: 172 MG/DL (ref 74–99)
HCT VFR BLD AUTO: 48.7 % (ref 41–52)
HDLC SERPL-MCNC: 56.4 MG/DL
HGB BLD-MCNC: 14.7 G/DL (ref 13.5–17.5)
IMM GRANULOCYTES # BLD AUTO: 0.04 X10*3/UL (ref 0–0.7)
IMM GRANULOCYTES NFR BLD AUTO: 0.6 % (ref 0–0.9)
LDLC SERPL CALC-MCNC: 81 MG/DL
LYMPHOCYTES # BLD AUTO: 1.92 X10*3/UL (ref 1.2–4.8)
LYMPHOCYTES NFR BLD AUTO: 29.2 %
MCH RBC QN AUTO: 24.5 PG (ref 26–34)
MCHC RBC AUTO-ENTMCNC: 30.2 G/DL (ref 32–36)
MCV RBC AUTO: 81 FL (ref 80–100)
MONOCYTES # BLD AUTO: 0.6 X10*3/UL (ref 0.1–1)
MONOCYTES NFR BLD AUTO: 9.1 %
NEUTROPHILS # BLD AUTO: 3.4 X10*3/UL (ref 1.2–7.7)
NEUTROPHILS NFR BLD AUTO: 51.8 %
NON HDL CHOLESTEROL: 100 MG/DL (ref 0–149)
NRBC BLD-RTO: 0 /100 WBCS (ref 0–0)
PLATELET # BLD AUTO: 264 X10*3/UL (ref 150–450)
POTASSIUM SERPL-SCNC: 4.4 MMOL/L (ref 3.5–5.3)
PROT SERPL-MCNC: 6.7 G/DL (ref 6.4–8.2)
PSA SERPL-MCNC: 1.25 NG/ML
RBC # BLD AUTO: 5.99 X10*6/UL (ref 4.5–5.9)
SODIUM SERPL-SCNC: 141 MMOL/L (ref 136–145)
TRIGL SERPL-MCNC: 94 MG/DL (ref 0–149)
TSH SERPL-ACNC: 0.8 MIU/L (ref 0.44–3.98)
VIT B12 SERPL-MCNC: 537 PG/ML (ref 211–911)
VLDL: 19 MG/DL (ref 0–40)
WBC # BLD AUTO: 6.6 X10*3/UL (ref 4.4–11.3)

## 2023-12-30 PROCEDURE — 82607 VITAMIN B-12: CPT

## 2023-12-30 PROCEDURE — 84443 ASSAY THYROID STIM HORMONE: CPT

## 2023-12-30 PROCEDURE — 85025 COMPLETE CBC W/AUTO DIFF WBC: CPT

## 2023-12-30 PROCEDURE — 80061 LIPID PANEL: CPT

## 2023-12-30 PROCEDURE — 80053 COMPREHEN METABOLIC PANEL: CPT

## 2023-12-30 PROCEDURE — 84153 ASSAY OF PSA TOTAL: CPT

## 2023-12-30 PROCEDURE — 36415 COLL VENOUS BLD VENIPUNCTURE: CPT

## 2023-12-30 PROCEDURE — 83036 HEMOGLOBIN GLYCOSYLATED A1C: CPT

## 2023-12-31 LAB
EST. AVERAGE GLUCOSE BLD GHB EST-MCNC: 186 MG/DL
HBA1C MFR BLD: 8.1 %

## 2024-01-02 ENCOUNTER — TELEMEDICINE (OUTPATIENT)
Dept: BEHAVIORAL HEALTH | Facility: CLINIC | Age: 64
End: 2024-01-02
Payer: COMMERCIAL

## 2024-01-02 ENCOUNTER — CLINICAL SUPPORT (OUTPATIENT)
Dept: BEHAVIORAL HEALTH | Facility: CLINIC | Age: 64
End: 2024-01-02
Payer: COMMERCIAL

## 2024-01-02 DIAGNOSIS — F41.1 GAD (GENERALIZED ANXIETY DISORDER): Primary | ICD-10-CM

## 2024-01-02 DIAGNOSIS — F41.1 GAD (GENERALIZED ANXIETY DISORDER): ICD-10-CM

## 2024-01-02 DIAGNOSIS — F33.1 MODERATE EPISODE OF RECURRENT MAJOR DEPRESSIVE DISORDER (MULTI): ICD-10-CM

## 2024-01-02 DIAGNOSIS — F33.2 SEVERE RECURRENT MAJOR DEPRESSION WITHOUT PSYCHOTIC FEATURES (MULTI): Primary | ICD-10-CM

## 2024-01-02 DIAGNOSIS — F43.21 GRIEF: ICD-10-CM

## 2024-01-02 DIAGNOSIS — E11.36: ICD-10-CM

## 2024-01-02 DIAGNOSIS — G47.00 INSOMNIA, UNSPECIFIED TYPE: ICD-10-CM

## 2024-01-02 DIAGNOSIS — Z79.899 MEDICATION MANAGEMENT: ICD-10-CM

## 2024-01-02 PROCEDURE — 90853 GROUP PSYCHOTHERAPY: CPT | Mod: U2,95

## 2024-01-02 PROCEDURE — 99214 OFFICE O/P EST MOD 30 MIN: CPT | Performed by: NURSE PRACTITIONER

## 2024-01-02 ASSESSMENT — ENCOUNTER SYMPTOMS
CONSTITUTIONAL NEGATIVE: 1
NAUSEA: 1

## 2024-01-02 NOTE — GROUP NOTE
Group Topic: Coping Skills   Group Date: 1/2/2024  Start Time: 11:00 AM  End Time: 12:00 PM  Facilitators: Iraida Ball PsyD   Department:  [facility]    Number of Participants: 11   Group Focus: coping skills  Treatment Modality: Cognitive Behavioral Therapy  Interventions utilized were patient education  Purpose: coping skills    This was a video IOP group on a HIPAA compliant platform. All patients were provided with informed consent.    Group topic: Behavioral Activation  Participants shared experiences related to their vicious cycles. They received psychoeducation on the various activities in behavioral activation (value, pleasure and mastery). They identified activities they would be willing to engage in for each category.   Iraida Ball Psy.D.        Name: Leodan Abel YOB: 1960   MR: [id number]      Leodan was present and actively engaged in discussion. He stated that he would like to reengage in bowling for enjoyment. He discussed barriers to this and ways to overcome them.

## 2024-01-02 NOTE — GROUP NOTE
Group Topic: Goals   Group Date: 1/2/2024  Start Time:  9:00 AM  End Time: 10:00 AM  Facilitators: OLVIN Aguila; Iraida Ball PsyD   [facility]    Number of Participants: 11   Group Focus: activities of daily living skills, check in, coping skills, and goals  Treatment Modality: Behavior Modification Therapy, Cognitive Behavioral Therapy, and Solution-Focused Therapy  Interventions utilized were group exercise, problem solving, and support  Purpose: coping skills, insight or knowledge, self-care, and relapse prevention strategies    Name: Leodan Abel YOB: 1960   MR: [id number]    This was a video IOP group on a HIPAA compliant platform. All patients were provided with informed consent.     Date: 1/2/2024  Time: 9-10a, Number of Patients: 11, User Name: [email or web address], Number of Staff: 1    Group topic(s): Weekend SMART goal review    Group members took turns sharing progress and challenges they faced this weekend related to their identified SMART goals.     Danny was present on camera and appeared attentive as others shared about their weekend experiences. He shared getting cleaning done around the house, getting lab work completed and taking steps to meet new people in hopes of finding a . He shared deep breaths and keeping a positive attitude helps him combat thoughts of self-doubt and low self-esteem.     Primary Facilitator Karena Nguyen, OLVIN-S, [facility][note 3]

## 2024-01-02 NOTE — PROGRESS NOTES
"HPI  Danny Abel \"Leodan\" is a 63 y.o. male patient with a chief complaint of   Chief Complaint   Patient presents with    Anxiety    Depression    Sleeping Problem    Med Management    presenting to outpatient treatment for a scheduled psych intensive outpatient psychiatric med check.    NOTE: Symptom scale is rated where 0 = no symptoms at all, and 10 = symptoms so severe that pt is an imminent danger to themselves or others and requires hospitalization.    Anxiety and Depression remain(s) present more days than not, which has improved over the past few weeks. Danny Abel rates the severity of psych symptoms as a 2/10 (last rated 3/10), noting symptom improvement with watching TV, and worsening of symptoms with grief re: death of wife (Elin) in ~2013 and work-related stressors.    -Mood: \"I was starting to feel bad this weekend, like 'am I ever going to get a girlfriend?' But I'm getting more hits on the online dating site - I have a coffee date tonight.\" Overall, feeling mood continues to slowly improve. Starting new day shift job in 2 weeks.     Per hx: started bupropion XL 12/02/2023.   -Sleep/Energy/Motivation: \"sleep's been fine. The only time I have a bad time sleeping - it goes back to when I was 9 years old - is Sunday night.\" Otherwise, denies issues/changes since last visit.       Per hx: noting sleeping a little less since starting bupropion but finds the clonazepam helpful to compensate. \"I like to sleep a lot - that's when I feel most at peace/comfortable.\" Denies issues falling/staying asleep - finds clonazepam 0.75mg helpful for sleep. Dx with [facility] in 2008 (2-3 sleep studies), \"I couldn't do the CPAP - I tried everything.\"  -Appetite/Weight Changes: \"the nausea is still bad. It seems as soon as I take it I have the nausea.\" Reports taking it after having a bowl of cereal - taking ginger ale after the pill \"seems to help calm it down.\" Otherwise, denies issues/changes since last visit. " "  -Psychosis: denies issues/changes since last visit.   -SI/HI: denies issues/changes since last visit.       HISTORY  -Psych Hx: Dr. Bentley Larsen (psychologist - ); Dr. Ace Kat (psychiatrist - ); hx of multiple providers and therapists in various states. Attended [facility] in 2001 s/p end of 1st marriage.  -Psych Med Hx: gabapentin (for neuropathy - felt really depressed/tired, even on 100mg/day); sertraline (on it ~20 yrs, historically did well on 150mg/day, switched to Cymbalta to try to help with both psych symptoms and neuropathy); prazosin (\"I was so zoned out/tired\"); lamotrigine (\"that was horrible,\" increased SI); bupropion (first antidepressant pt tried in 2001, \"I got crying jags, but I still have it - and that was 21 years ago\"); oxcarbazepine and risperidone (\"that wasn't good\"); nortriptyline 80mg (under care of psychiatrist, \"it was giving me dry mouth,\" but found it helpful for depression).  -Substance Use Hx: denies illicit substance use.  -Tobacco: endorses \"occasional use,\" denies currently using.  -Family Psych Hx: mother (anxiety/depression); father (depression)  -Family Substance Abuse Hx: father (EtOH).  -Supports: endorses lack of social support (has moved many times since death of wife in ~2013).  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  -Education: [employer]  -The past, family, and social history has been reviewed and there are no findings pertinent to the chief complaint.       REVIEW OF SYSTEMS  Review of Systems   Constitutional: Negative.    Gastrointestinal:  Positive for nausea.   All other systems reviewed and are negative.    Objective   Physical Exam  Psychiatric:         Attention and Perception: Attention and perception normal.         Mood and Affect: Mood is anxious and depressed. Affect is flat.         Speech: Speech normal.         Behavior: Behavior normal. Behavior is cooperative.         Thought Content: Thought " content normal.         Cognition and Memory: Cognition and memory normal.         Judgment: Judgment normal.         MEDICAL-DECISION MAKING  Mood-wise continuing his slow/steady improvement. Still having nausea - discussed ways to adjust taking the food (i.e., before/after breakfast, taking with ginger ale, etc.) - also discussed switching duloxetine to at bedtime to space out the antidepressants from each other to ease nausea. Next week is last week in [facility]. Sees Dr. Kat for psych med mgmt and Dr. Larsen for talk therapy in outpatient.      Psych med regimen as follows:   1. CHANGE TIMING: Duloxetine DR 60mg from QAM to at bedtime  2. Buspirone 30mg BID  3. Bupropion XL 150mg QAM  4. Clonazepam 0.75mg (0.5mg x1.5)/day-PRN ([facility] shows prescribed by Caden Augustin, DO up to 0.5mg BID, 30-day supply last filled 11/28/2023)     As previously mentioned, could consider cross-titrating from duloxetine to sertraline, but only if current med regimen isn't working well as pt is concerned about the length of time to switch antidepressants and is starting a new job soon.    IMPRESSION  -SANDOR  -MDD, recurrent, moderate - active  -Grief  -Insomnia disorder, unspecified (rule out secondary to anxiety vs. secondary to underlying medical condition - [facility]NA)      PLAN  -Continue Medications as directed.  -Follow-up with this provider in 1 week in [facility].  -Risks/benefits/assessment of medication interventions discussed with pt; pt agreeable to plan. Will continue to monitor for symptoms mgmt and SEs and adjust plan as needed.  -MI to increase coping skills/behavior regulation.  -Safety plan reviewed.  -Call  Psychiatry at [phone] with issues.  -For [facility] residents, [employer] is a 24/7 hotline you can call for assistance at [phone]) 857-7277. Please call 911 or go to your closest Emergency Room if you feel worse. This includes thoughts of hurting yourself or anyone else, or having other troubles such as hearing  voices, seeing visions, or having new and scary thoughts about the people around you.

## 2024-01-02 NOTE — GROUP NOTE
Group Topic: Coping Skills   Group Date: 1/2/2024  Start Time: 10:00 AM  End Time: 11:00 AM  Facilitators: Iraida Ball PsyD   Department:  [facility]    Number of Participants: 11   Group Focus: coping skills  Treatment Modality: Cognitive Behavioral Therapy  Interventions utilized were patient education  Purpose: coping skills    This was a video IOP group on a HIPAA compliant platform. All patients were provided with informed consent.    Group topic: Check-in and Behavioral Activation  Group members finished checking in with their weekend goals.    Participants received education on Behavioral Activation, motivation and vicious cycles of depression. They identified their own vicious cycles that keep them stuck in low mood.   Iraida Ball Psy.D.        Name: Leodan Abel YOB: 1960   MR: [id number]      Leodan was present and actively engaged in discussion. He discussed being stuck in a vicious cycle regarding dating and discussed how he is working to change this cycle.

## 2024-01-03 ENCOUNTER — CLINICAL SUPPORT (OUTPATIENT)
Dept: BEHAVIORAL HEALTH | Facility: CLINIC | Age: 64
End: 2024-01-03
Payer: COMMERCIAL

## 2024-01-03 DIAGNOSIS — F41.1 GAD (GENERALIZED ANXIETY DISORDER): ICD-10-CM

## 2024-01-03 DIAGNOSIS — F33.1 MODERATE EPISODE OF RECURRENT MAJOR DEPRESSIVE DISORDER (MULTI): Primary | ICD-10-CM

## 2024-01-03 PROCEDURE — 90853 GROUP PSYCHOTHERAPY: CPT | Mod: U2,95

## 2024-01-03 NOTE — GROUP NOTE
Group Topic: Coping Skills   Group Date: 1/3/2024  Start Time: 11:00 AM  End Time: 12:00 PM  Facilitators: OLVIN Aguila; Iraida Ball PsyD   [facility]    Number of Participants: 12   Group Focus: coping skills, feeling awareness/expression, impulsivity, and psychiatric education  Treatment Modality: Cognitive Behavioral Therapy and Patient-Centered Therapy  Interventions utilized were clarification, exploration, group exercise, and patient education  Purpose: coping skills, maladaptive thinking, insight or knowledge, and self-worth    Name: Leodan Abel YOB: 1960   MR: [id number]    This session was conducted via HIPAA compliant video. Patient was provided with informed consent.  Date: 1/3/23  Time: 11:00 am to 12:00 pm  Group Members: 12 Number of Staff: 1  [email or web address]    Group Topic: Psychoeducation of CBT continued; Group members learned about automatic thoughts and how they can be unhelpful when experiencing psychological distress. Group members completed a writing exercise to identify how experiences lead to automatic thoughts. They then practiced challenging and replacing the automatic thoughts with more accurate, neutral thoughts.     Patient was present on camera. He was attentive as ways to develop self-awareness of unhelpful thinking patterns were reviewed as well as the importance of challenging those unhelpful automatic thoughts. Patient was observed writing during the group exercise and when asked to write a positive affirmation to re-visit later today.    Primary Facilitator Karena Nguyen, OLVIN-S, [facility]

## 2024-01-03 NOTE — GROUP NOTE
Group Topic: Coping Skills   Group Date: 1/3/2024  Start Time: 10:00 AM  End Time: 11:00 AM  Facilitators: OLVIN Aguila; Iraida Ball PsyD   [facility]    Number of Participants: 13   Group Focus: coping skills, psychiatric education, and self-awareness  Treatment Modality: Behavior Modification Therapy, Cognitive Behavioral Therapy, and Patient-Centered Therapy  Interventions utilized were exploration and patient education  Purpose: coping skills, maladaptive thinking, and insight or knowledge    Name: Leodan Abel YOB: 1960   MR: [id number]    This session was conducted via HIPAA compliant video. Patient was provided with informed consent.    Date: 1/3/24  Time: 10:00 am to 11:00 am  Group Members: 13  Number of Staff: 1  [email or web address]    Group Topic: Psychoeducational video introducing CBT. CBT concepts of situations, thoughts, feelings and reactions were reviewed. Group members then spent time discussing how negative automatic thoughts impact their feelings and behaviors and reviewing various thought stopping techniques.    Patient was present and visible on camera. He was attentive as the information was reviewed. He seemed interested in learning about CBT and how this can be applied to his personal well-being and negative self-talk tendencies.     Primary Facilitator OLVIN Hernández-S, [facility]

## 2024-01-04 ENCOUNTER — CLINICAL SUPPORT (OUTPATIENT)
Dept: BEHAVIORAL HEALTH | Facility: CLINIC | Age: 64
End: 2024-01-04
Payer: COMMERCIAL

## 2024-01-04 DIAGNOSIS — F41.1 GAD (GENERALIZED ANXIETY DISORDER): ICD-10-CM

## 2024-01-04 DIAGNOSIS — F33.1 MODERATE EPISODE OF RECURRENT MAJOR DEPRESSIVE DISORDER (MULTI): Primary | ICD-10-CM

## 2024-01-04 PROCEDURE — 90853 GROUP PSYCHOTHERAPY: CPT | Mod: U2,95

## 2024-01-04 NOTE — GROUP NOTE
Group Topic: Self Esteem   Group Date: 1/4/2024  Start Time: 11:00 AM  End Time: 12:00 PM  Facilitators: OLVIN Aguila   Department: [facility]    Number of Participants: 9   Group Focus: activities of daily living skills, self-awareness, and self-esteem  Treatment Modality: Behavior Modification Therapy, Cognitive Behavioral Therapy, and Solution-Focused Therapy  Interventions utilized were exploration and support  Purpose: insight or knowledge    Name: Leodan Abel YOB: 1960   MR: [id number]    This was a video IOP group on a HIPAA compliant platform. All patients were provided with informed consent.   Date: 1/4/2024  Time: 11a-12p, Number of Patients: 9, User Name: Kai, Number of Staff: 1    Group topic(s): Group members spent time identifying specific concrete examples of their treatment progress, writing them down and sharing this progress out loud.    Danny was present on camera and appeared attentive. He was observed writing during the activity. He shared he has made significant progress with sticking with his sleep routine and addressing his medical and medication needs. It was also shared with him that he has made progress in making social connections and taking actions to change his job situation.     Primary Facilitator Karena Nguyen, OLVIN-S, [facility]

## 2024-01-04 NOTE — GROUP NOTE
Group Topic: Goals   Group Date: 1/4/2024  Start Time:  9:00 AM  End Time: 10:00 AM  Facilitators: OLVIN Aguila   Department: [facility]U [facility]    Number of Participants: 9   Group Focus: activities of daily living skills, coping skills, daily focus, and goals  Treatment Modality: Behavior Modification Therapy, Cognitive Behavioral Therapy, and Patient-Centered Therapy  Interventions utilized were assignment, exploration, group exercise, and problem solving  Purpose: coping skills, self-care, relapse prevention strategies, and trigger / craving management    Name: Leodan Abel YOB: 1960   MR: [id number]    This was a video IOP group on a HIPAA compliant platform. All patients were provided with informed consent.   Date: 1/4/2024  Time: 9-10a, Number of Patients: 9, User Name: [email or web address], Number of Staff: 1    Group topic(s): Group members began group by listening to an educational video about behavioral activation, atomic habits and getting 1% better each day. The group then spent time sharing takeaways from the video. Group members then reviewed the SMART goal acronym and were instructed to identify goals for the weekend around accomplishment, closeness to others and enjoyment. The were also asked to identify potential obstacles and coping skills.    Ledoan was present on camera and appeared attentive. He did not share his thoughts on the videos but was observed listening as others shared what stood out.   He was observed writing his weekend goals.     Primary Facilitator Karena Nguyen, OLVIN-S, [facility]

## 2024-01-04 NOTE — GROUP NOTE
Group Topic: Goals   Group Date: 1/4/2024  Start Time: 10:00 AM  End Time: 11:00 AM  Facilitators: OLVIN Aguila   Department: [facility]U [facility]    Number of Participants: 9   Group Focus: activities of daily living skills, coping skills, daily focus, and goals  Treatment Modality: Behavior Modification Therapy, Cognitive Behavioral Therapy, and Patient-Centered Therapy  Interventions utilized were assignment, exploration, group exercise, and problem solving  Purpose: coping skills, self-care, relapse prevention strategies, and trigger / craving management    Name: Leodan Abel YOB: 1960   MR: [id number]    This was a video IOP group on a HIPAA compliant platform. All patients were provided with informed consent.   Date: 1/4/2024  Time: 10a-11a, Number of Patients: 9, User Name: Kai, Number of Staff: 1    Group topic(s): SMART goals continued. Group members spent time taking turns sharing their SMART goals, potential obstacles and coping skills for the upcoming weekend.   Leodan was present on camera and appeared attentive. Weekend goals he identified include shop, clean, laundry, getting new shoes, going to dinner, taking a nap with cat, watching football jameel  movie, and exploring new city where new job will be. Coping skills identified include taking it one day at a time, having a positive outlook, deep breathing and sticking with his sleep routine.    Primary Facilitator Karena Nguyen, OLVIN-S, [facility]

## 2024-01-05 ENCOUNTER — DOCUMENTATION (OUTPATIENT)
Dept: BEHAVIORAL HEALTH | Facility: CLINIC | Age: 64
End: 2024-01-05

## 2024-01-05 ENCOUNTER — TELEMEDICINE (OUTPATIENT)
Dept: BEHAVIORAL HEALTH | Facility: CLINIC | Age: 64
End: 2024-01-05
Payer: COMMERCIAL

## 2024-01-05 DIAGNOSIS — F33.1 MODERATE EPISODE OF RECURRENT MAJOR DEPRESSIVE DISORDER (MULTI): ICD-10-CM

## 2024-01-05 PROCEDURE — 90834 PSYTX W PT 45 MINUTES: CPT | Performed by: COUNSELOR

## 2024-01-05 NOTE — PROGRESS NOTES
All Individuals Present: Patient and Provider (Encounter Provider)     ID: Danny Abel is a 63 y.o. male      Pt met with [facility] for follow-up visit for symptoms of depression.    An interactive audio and video telecommunication system which permits real time communications between the patient (at the originating site) and the provider (at the distant site) was utilized to provide this telehealth service. Verbal consent was requested and obtained from Danny Abel on this date 01/05/24 for a telehealth visit.    Pt is meeting with therapist as a part of the Personalized Care for Complex Lives Program.    Mental Status Exam  General Appearance: Well groomed, appropriate eye contact.  Attitude/Behavior: Cooperative, conversant, engaged, and with good eye contact.  Motor: No psychomotor agitation or retardation, no tremor or other abnormal movements.  Speech: Normal rate, volume, prosody  Gait/Station: Within normal limits  Mood: Normal.  Affect: Dysphoric, constricted but reactive and Congruent with mood and topic of conversation  Thought Process: Linear, goal directed  Thought Associations: No loosening of associations  Thought Content: normal  Sensorium: Alert and oriented to person, place, time and situation  Insight: Intact, as evidenced by Pt discussion  Judgment: Intact      Risk Assessment:  Imminent Risk of Suicide or Serious Self-Injury: None, denied  Imminent Risk of Violence or Homicide: None, denied    Progress Note:  Pt shared that he is feeling better overall in the past few weeks. He noted that he is still having some nausea but is not terrible overall. Pt stated that he was contacted by a company he had previously interviewed with about a job, but he is uncertain. [facility] engaged Pt in discussion about positives and negatives in the situation before Pt identified that he is not really excited about the opportunity. Pt also shared that he went on a date the other day and felt that it was a  good experience overall. He was uncertain at first, but reported that the outcome was good overall. He noted that he is going to continue to use the dating apps until he has more certainty.    Start Time: 2:00 pm  End Time: 2:39 pm   CPT Code: 28968    Attestation Statements   Number of minutes spent performing psychotherapy: 39

## 2024-01-08 ENCOUNTER — CLINICAL SUPPORT (OUTPATIENT)
Dept: BEHAVIORAL HEALTH | Facility: CLINIC | Age: 64
End: 2024-01-08
Payer: COMMERCIAL

## 2024-01-08 DIAGNOSIS — F33.2 SEVERE RECURRENT MAJOR DEPRESSION WITHOUT PSYCHOTIC FEATURES (MULTI): ICD-10-CM

## 2024-01-08 DIAGNOSIS — F41.1 GAD (GENERALIZED ANXIETY DISORDER): ICD-10-CM

## 2024-01-08 PROBLEM — K52.9 INFLAMMATION OF SMALL INTESTINE: Status: ACTIVE | Noted: 2023-03-15

## 2024-01-08 PROBLEM — R52 GENERALIZED PAIN: Status: ACTIVE | Noted: 2023-10-02

## 2024-01-08 PROCEDURE — 90853 GROUP PSYCHOTHERAPY: CPT | Mod: U2,95

## 2024-01-08 NOTE — GROUP NOTE
Group Topic: Coping Skills   Group Date: 1/3/2024  Start Time:  9:00 AM  End Time: 10:00 AM  Facilitators: Iraida Ball PsyD   Department:  [facility]    Number of Participants: 13   Group Focus: mindfulness  Treatment Modality: Skills Training  Interventions utilized were patient education  Purpose: other: Mindfulness    This was a video IOP group on a HIPAA compliant platform. All patients were provided with informed consent.  Group topic: Mindfulness  Group members received psychoeducation on the foundations of mindfulness, wise mind and why mindfulness is useful. They explored ways to practice mindfulness (e.g., meditation, body scan, mindful eating) and identified how they may practice this in their day to day lives.   Iraida Ball Psy.D.      Name: Leodan Abel YOB: 1960   MR: [id number]      Leodan was present and actively engaged in discussion. He identified ways he would be willing to practice mindfulness and how it may be helpful to him.

## 2024-01-09 ENCOUNTER — DOCUMENTATION (OUTPATIENT)
Dept: BEHAVIORAL HEALTH | Facility: CLINIC | Age: 64
End: 2024-01-09

## 2024-01-09 ENCOUNTER — TELEMEDICINE (OUTPATIENT)
Dept: BEHAVIORAL HEALTH | Facility: CLINIC | Age: 64
End: 2024-01-09
Payer: COMMERCIAL

## 2024-01-09 ENCOUNTER — CLINICAL SUPPORT (OUTPATIENT)
Dept: BEHAVIORAL HEALTH | Facility: CLINIC | Age: 64
End: 2024-01-09
Payer: COMMERCIAL

## 2024-01-09 DIAGNOSIS — F41.1 GAD (GENERALIZED ANXIETY DISORDER): ICD-10-CM

## 2024-01-09 DIAGNOSIS — Z79.899 MEDICATION MANAGEMENT: ICD-10-CM

## 2024-01-09 DIAGNOSIS — F43.21 GRIEF: ICD-10-CM

## 2024-01-09 DIAGNOSIS — F33.2 SEVERE RECURRENT MAJOR DEPRESSION WITHOUT PSYCHOTIC FEATURES (MULTI): ICD-10-CM

## 2024-01-09 DIAGNOSIS — F33.1 MODERATE EPISODE OF RECURRENT MAJOR DEPRESSIVE DISORDER (MULTI): ICD-10-CM

## 2024-01-09 DIAGNOSIS — F41.1 GAD (GENERALIZED ANXIETY DISORDER): Primary | ICD-10-CM

## 2024-01-09 DIAGNOSIS — G47.00 INSOMNIA, UNSPECIFIED TYPE: ICD-10-CM

## 2024-01-09 PROCEDURE — 99214 OFFICE O/P EST MOD 30 MIN: CPT | Performed by: NURSE PRACTITIONER

## 2024-01-09 PROCEDURE — 90853 GROUP PSYCHOTHERAPY: CPT | Mod: U2,95

## 2024-01-09 RX ORDER — BUSPIRONE HYDROCHLORIDE 30 MG/1
30 TABLET ORAL 2 TIMES DAILY
Qty: 180 TABLET | Refills: 3 | Status: SHIPPED | OUTPATIENT
Start: 2024-01-09 | End: 2025-01-08

## 2024-01-09 RX ORDER — BUPROPION HYDROCHLORIDE 150 MG/1
150 TABLET ORAL EVERY MORNING
Qty: 90 TABLET | Refills: 3 | Status: SHIPPED | OUTPATIENT
Start: 2024-01-09 | End: 2025-01-08

## 2024-01-09 RX ORDER — DULOXETIN HYDROCHLORIDE 60 MG/1
60 CAPSULE, DELAYED RELEASE ORAL DAILY
Qty: 90 CAPSULE | Refills: 3 | Status: SHIPPED | OUTPATIENT
Start: 2024-01-09 | End: 2025-01-08

## 2024-01-09 RX ORDER — CLONAZEPAM 0.5 MG/1
0.5 TABLET ORAL 2 TIMES DAILY PRN
Qty: 60 TABLET | Refills: 2 | Status: SHIPPED | OUTPATIENT
Start: 2024-01-09 | End: 2024-04-08

## 2024-01-09 ASSESSMENT — ENCOUNTER SYMPTOMS
NAUSEA: 1
CONSTITUTIONAL NEGATIVE: 1

## 2024-01-09 NOTE — GROUP NOTE
Group Topic: Medication Management   Group Date: 1/9/2024  Start Time:  9:00 AM  End Time: 10:00 AM  Facilitators: OLVIN Aguila; Iraida Ball PsyD   [facility]    Number of Participants: 9   Group Focus: acceptance and psychiatric education  Treatment Modality: Behavior Modification Therapy, Cognitive Behavioral Therapy, and Psychoeducation  Interventions utilized were exploration, patient education, and problem solving  Purpose: insight or knowledge    Name: Leodan Abel YOB: 1960   MR: [id number]    This was a video IOP group on a HIPAA compliant platform. All patients were provided with informed consent.     Date:1/9/24, Time: 9:00am to 10:00am, Number of Patients: 9, User Name:[email or web address],  Number of Staff: 1  Group topic(s): Psycho education of medication management.   Group members  started the morning off with a daily reading with a brief follow-up discussion. Then the group learned about the benefits and setbacks of utilizing medications as part of their mental health treatment. Group members shared personal experiences with trying various medications, discussed side effects, short-term vs. long-term medication use and the importance of asking questions and advocating for their needs with medication prescribers.   Leodan was present on camera and appeared attentive as other group members shared. He shared feeling very comfortable with advocating for himself and encouraged others to do the same. He also shared feeling some providers have pushed him to take the highest dose but that this was not helpful for him.     Provider Signature: OLVIN Hernández-S, [facility]

## 2024-01-09 NOTE — PROGRESS NOTES
"HPI  Danny Abel \"Leodan\" is a 63 y.o. male patient with a chief complaint of   Chief Complaint   Patient presents with    Anxiety    Depression    Sleeping Problem    Med Management    presenting to outpatient treatment for a scheduled psych intensive outpatient psychiatric med check.    NOTE: Symptom scale is rated where 0 = no symptoms at all, and 10 = symptoms so severe that pt is an imminent danger to themselves or others and requires hospitalization.    Anxiety and Depression remain(s) present more days than not, which has improved over the past few weeks. Danny Abel rates the severity of psych symptoms as a 2/10 (last rated 2/10), noting symptom improvement with watching TV, and worsening of symptoms with grief re: death of wife (Elin) in ~2013 and work-related stressors.    -Mood: \"good. That date - as soon as I walked in - it cost me $100. We started talking - she's one of these high society people.\" Wound up being turned down - pt feeling down about that, \"I got mixed feeling about starting a new relationship when I'm starting a new job.\" Looking forward to starting new job.     Per hx: started bupropion XL 12/02/2023.   -Sleep/Energy/Motivation: denies issues/changes since last visit.      Per hx: \"sleep's been fine. The only time I have a bad time sleeping - it goes back to when I was 9 years old - is Sunday night.\" Noting sleeping a little less since starting bupropion but finds the clonazepam helpful to compensate. \"I like to sleep a lot - that's when I feel most at peace/comfortable.\" Denies issues falling/staying asleep - finds clonazepam 0.75mg helpful for sleep. Dx with SHAWNA in 2008 (2-3 sleep studies), \"I couldn't do the CPAP - I tried everything.\"  -Appetite/Weight Changes: \"I switched the duloxetine to bedtime - really no difference with nausea in the morning. Today was weird: usually I have 2 cups of coffee in the morning, then I have breakfast, then I take the morning meds and " "will have nausea immediately. Today I spaced it out more and had less nausea.\" Otherwise, denies issues/changes since last visit.   -Psychosis: denies issues/changes since last visit.   -SI/HI: denies issues/changes since last visit.       HISTORY  -Psych Hx: Dr. Bentley Larsen (psychologist - ); Dr. Ace Kat (psychiatrist - ); hx of multiple providers and therapists in various states. Attended [facility] in 2001 s/p end of 1st marriage.  -Psych Med Hx: gabapentin (for neuropathy - felt really depressed/tired, even on 100mg/day); sertraline (on it ~20 yrs, historically did well on 150mg/day, switched to Cymbalta to try to help with both psych symptoms and neuropathy); prazosin (\"I was so zoned out/tired\"); lamotrigine (\"that was horrible,\" increased SI); bupropion (first antidepressant pt tried in 2001, \"I got crying jags, but I still have it - and that was 21 years ago\"); oxcarbazepine and risperidone (\"that wasn't good\"); nortriptyline 80mg (under care of psychiatrist, \"it was giving me dry mouth,\" but found it helpful for depression).  -Substance Use Hx: denies illicit substance use.  -Tobacco: endorses \"occasional use,\" denies currently using.  -Family Psych Hx: mother (anxiety/depression); father (depression)  -Family Substance Abuse Hx: father (EtOH).  -Supports: endorses lack of social support (has moved many times since death of wife in ~2013).  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  -Education: [employer]  -The past, family, and social history has been reviewed and there are no findings pertinent to the chief complaint.       REVIEW OF SYSTEMS  Review of Systems   Constitutional: Negative.    Gastrointestinal:  Positive for nausea.   All other systems reviewed and are negative.    Objective   Physical Exam  Psychiatric:         Attention and Perception: Attention and perception normal.         Mood and Affect: Mood is anxious and depressed. Affect is " flat.         Speech: Speech normal.         Behavior: Behavior normal. Behavior is cooperative.         Thought Content: Thought content normal.         Cognition and Memory: Cognition and memory normal.         Judgment: Judgment normal.         MEDICAL-DECISION MAKING  Mood-wise remains stable, similar to last visit with depressive/anxious symptoms predominantly well-managed on current psych med regimen. Main issue remains nausea: has had some success mitigating GI upset with spacing out breakfast and AM meds, but still somewhat bothersome for pt. This is pt's last week in IOP - has outpatient psych services already established but no current follow-up scheduled (encouraged pt to set that up so he doesn't run out of Rx). Will order refills to cover gaps in care. Looking forward to starting new job upon completion of IOP.     Psych med regimen as follows:   1. Duloxetine DR 60mg/day  2. Buspirone 30mg BID  3. Bupropion XL 150mg QAM  4. Clonazepam 0.5mg BID-PRN     As previously mentioned, could consider cross-titrating from duloxetine to sertraline, but only if current med regimen isn't working well as pt is concerned about the length of time to switch antidepressants and is starting a new job soon.    IMPRESSION  -SANDOR  -MDD, recurrent, moderate - active  -Grief  -Insomnia disorder, unspecified (rule out secondary to anxiety vs. secondary to underlying medical condition - SHAWNA)      PLAN  -Continue Medications as directed.  -Follow-up with this provider as needed - congratulations on completing IOP!  -Risks/benefits/assessment of medication interventions discussed with pt; pt agreeable to plan. Will continue to monitor for symptoms mgmt and SEs and adjust plan as needed.  -MI to increase coping skills/behavior regulation.  -Safety plan reviewed.  -Call  Psychiatry at [phone] with issues.  -For [facility] residents, [employer] is a 24/7 hotline you can call for assistance at [phone] [phone]. Please  call 911 or go to your closest Emergency Room if you feel worse. This includes thoughts of hurting yourself or anyone else, or having other troubles such as hearing voices, seeing visions, or having new and scary thoughts about the people around you.

## 2024-01-09 NOTE — PROGRESS NOTES
----- Message -----  From: Hyun Mendiola M.D.  Sent: 1/24/2018  10:28 AM  To: Cindi Gerber R.N.    Good news - normal & reassuring xray, that & lab show no evidence of trouble with the amio  Will await other tests - I think....    ==========================================================    Called pt and discussed Xray and lab per Dr Mendiola, pt verbalizes understanding          [facility] staff met with pt. to discuss discharge planning and explore next steps for treatment. Pt. is discharged from [facility] as of Thursday January 11th, 2024. Pt. plans to follow up with current providers, Dr. Kat and Dr. Larsen. Pt. was offered information and education on discharge and [facility] Aftercare programing. Pt. completed CSSRS and was deemed low suicide risk. Please see CSSRS below.    SAFE-T Protocol with C-SSRS  STEP 1: Identify Risk Factors  In the past month:  1) Wish to be dead - No   2) Current suicidal thoughts - No   3) Suicidal thoughts with method - No   4) Suicidal intent without specific plan - No   5) Intent with plan - No      C-SSRS Suicidal Behavior:  Have you ever done anything, started to do anything, or prepared to do anything to end your life?  Lifetime: No   Past 3 months: No      Current and Past Psychiatric Dx: Mood disorder      Presenting Symptoms: Anxiety and/or panic      Family History: Denied      Precipitants/Stressors: Triggering events leading to humiliation, shame and/or despair, inadequate social supports, social isolation      Change in Treatment: Denied      Access to lethal methods?: Denied owning guns or having access to firearms.      STEP 2: Identify Protective Factors  Internal:  Ability to cope with stress - Yes   Frustration tolerance - Yes   Synagogue beliefs - Yes   Fear of death or the actual act of killing self - No   Identifies reasons for living - Yes      External:  Cultural, spiritual and/or moral attitudes against suicide - No   Responsibilities to children - Yes   Beloved pets - Yes   Supportive social network of family or friends - Yes   Positive therapeutic relationships - Yes   Engaged in work - Yes      STEP 3: Specific questioning about thoughts, plans and suicidal intent  Rated on a severity scale of 1-5  Frequency (how many times have you had these thoughts?): 0  Duration (when you have the thoughts how long do they last?): 0  Controllability  (could/can you stop thinking about killing yourself or wanting to die if you want to?): 0  Deterrents (are there things - anyone or anything - that stopped you from wanting to die or acting on thoughts of suicide?): 0  Reasons for ideation (what sort of reasons did you have for thinking about wanting to die or killing yourself?): 0     STEP 4: Guidelines to determine level of risk and develop interventions to lower risk level  High:  Moderate:  Low: X    Ny Somers [facility]

## 2024-01-09 NOTE — PROGRESS NOTES
INTENSIVE OUTPATIENT PROGRAM MULTIDISCIPLINARY  TREATMENT PLAN & PROGRESS NOTE     Patient: Danny Abel  : 1960  Age: 63     Student: No  Treatment Team Date: 23  MRN#: [id number]    Attending Physician: Dr. BENTON Lopez MD  Date of IOP Admission: 23   Ref by: Dr. Larsen  Week: 6                     Admission Scores:   DASS21: 43 ANN: 25    BDI:  45 PHQ-9: 20 MAST: 0 DAST: 0 MDQ: Negative      Current Risk Assessment:  Suicidal Risk:  None:      Ideation: X       Plan:      Intent:      SI Plan with plan contracts for safety:  Hx of harming self:        Homicidal Risk:  None: X     Ideation:       Plan:      Intent:      HI Plan with means:  Hx of harming others:          Global Improvement  Clinical Global Impressions Rating Scale Of Illness:  5  1-No Illness Present   2-Minimal   3-Mild   4-Moderate   5-Marked X   6-Severe     7-Very Severe    Diagnoses & ICD-10 Codes  Primary Diagnosis & Code: Major Depressive Disorder, Recurrent, Severe; F33.2  Secondary Diagnosis & Code: Generalized Anxiety Disorder; F41.1     Psychosocial & Environmental Stressors:   1. Dissatisfaction with work   2. Lack of social support   3. Grief/loss    Patient's Treatment Goals:  Date Initiated:           Progress Update:               1.  Attend and actively participate in IOP _4_ days/week for 3 hours per day  2023   Good X    Fair       Poor  Unclear    2.  Attend weekly medication management sessions and maintain compliance of medications  2023     Good X    Fair       Poor  Unclear                                                    3.  Identify symptoms of depression and anxiety while developing coping plans  2023   Good X    Fair       Poor  Unclear                                4.  Increase self-esteem and self-worth while increasing social connections   2023  Good        Fair X   Poor  Unclear      Current medications:  See EMR chart       Progress note:  __New to the IOP program,  attending as planned  __Compliant, Progressing & Improving - Needs more time in IOP therapy program   _X_Compliant, Progressing & Improving Planning Discharge   __Compliant, Not Progressing or Improving: Reason  __Non-Compliant, not progressing or improving: Plan  __Other:    Insurance & Benefits: [facility] [place] [facility], IN NETWORK, BENEFITS ARE BASED ON MEDICAL NECESSITY ONLY: Unlimited visits, covers 100% of the contracted rate after deductible has been met.     Co-Pay per day: $0    DEDUCTIBLE        Single: / Family: / Accumulation:  Single: /  Family: /   CO- INSURANCE  Single: / Family: / Accumulation:  Single: /  Family: /    OUT OF POCKET  Single: 1,600  Family: / Accumulation: Single: 1,600 Family: /      Total IOP Sessions completed & authorized to date:  20    Discharge plans have been discussed with patient & medication management provider on:   Reason for discharge:    ___Successfully completed IOP treatment:   ___Pt. decided to seek treatment elsewhere:   ___Dropped out or no show more than three times:  ___Benefits exhausted:   ___Other:    Discharge date:                     Discharge Prognosis: Excellent      Good     Fair     Poor    Follow up appointment with: Physician:   Follow up appointment with: Therapist:    Follow up Aftercare group?  Yes   Patient provided with Crisis Hotline phone number for suicide prevention? Yes     Discharge Scores: Not Completed    Emergency Treatment Plan Completed by patient: Yes__  No__          DASS21:   ANN:   BDI:   Treatment plan electronically signed by Treatment Team:   JUAN Nguyen, LPCC-S, LICKEHINDE, MEJIA Somers, OLVIN, JUAN Ball, PsSammi, JUAN Perla, APRN-CNP

## 2024-01-09 NOTE — GROUP NOTE
Group Topic: Goals   Group Date: 1/8/2024  Start Time:  9:00 AM  End Time: 10:00 AM  Facilitators: OLVIN Aguila; OLVIN Anderson   Department: [facility] HIEU [facility]    Number of Participants: 9   Group Focus: activities of daily living skills, check in, coping skills, daily focus, and goals  Treatment Modality: Behavior Modification Therapy, Cognitive Behavioral Therapy, and Patient-Centered Therapy  Interventions utilized were exploration and problem solving  Purpose: coping skills, self-care, relapse prevention strategies, and trigger / craving management    Name: Leodan Abel YOB: 1960   MR: [id number]      This was a video IOP group on a HIPAA compliant platform. All patients were provided with informed consent.   Date: 1/8/2024  Time: 9-10a, Number of Patients: 9, User Name: [email or web address], Number of Staff: 1    Group topic(s): Group members began with a warm welcome to a new group member followed by a review of weekend SMART goal progress, accomplishments and struggles.    Leodan was present on camera and appeared attentive. He shared he was able to clean, shop and get a haircut. He was informed the person he dated last week is no longer interested in seeing him and he admitted this 'stung a little'. 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 He shared continuing to still feel nausea and plans to talk about this with Vinny Perla and have ginger ale with him to help alleviate this feeling when he begins work next week.    Primary Facilitator Karena Nguyen, OLVIN-S, [facility]

## 2024-01-09 NOTE — GROUP NOTE
Group Topic: Personal Responsibility   Group Date: 1/8/2024  Start Time: 11:00 AM  End Time: 12:00 PM  Facilitators: OLVIN Anderson; OLVIN Aguila   [facility]    This was a video IOP group on a HIPAA compliant program. All patients were provided with informed consent.     Date: 1/8/2024, Time: 11a-12p, Number of Patients: 8, Username: [email or web address], Number of Staff: 1  Group Topic(s): communication styles. Individuals shared their own experiences and discussed types of communication they have seen. The group explored types of communicating including passive, aggressive, passive aggressive As a whole the group identified physical presentation, emotional components, behavioral cues, and beliefs within the different types of communication. Group discussion followed.        Name: Leodan Abel YOB: 1960   MR: [id number]      Leodan was on topic and present. Patient engaged fully and appropriately.   OLVIN Wilder

## 2024-01-09 NOTE — GROUP NOTE
Group Topic: Self Esteem   Group Date: 1/9/2024  Start Time: 10:00 AM  End Time: 11:00 AM  Facilitators: Iraida Ball PsyD; OLVIN Aguila   Department:  [facility]IEU [facility]    Number of Participants: 10   Group Focus: self-esteem  Treatment Modality: Cognitive Behavioral Therapy  Interventions utilized were patient education  Purpose: self-worth    This was a video IOP group on a HIPAA compliant platform. All patients were provided with informed consent.  Group topic: Self-compassion  Group members reviewed topic of behavioral activation from last week and discussed how they utilized this skill.     They engaged in self-compassion meditation and discussed benefits of meditation for self-compassion. Participants engaged in discussion about what it means to be kind to the self.   Iraida Ball Psy.D.        Name: Leodan Abel YOB: 1960   MR: [id number]      Leodan was present and actively listened to discussion.

## 2024-01-09 NOTE — GROUP NOTE
Group Topic: Self Esteem   Group Date: 1/9/2024  Start Time: 11:00 AM  End Time: 12:00 PM  Facilitators: Iraida Ball PsyD; Karena Nguyen [facility]   Department:  [facility]U [facility]    Number of Participants: 9   Group Focus: self-esteem  Treatment Modality: Cognitive Behavioral Therapy  Interventions utilized were patient education  Purpose: self-worth    This was a video IOP group on a HIPAA compliant platform. All patients were provided with informed consent.    Group topic: Self-compassion   Group members received psychoeducation on the components of self-compassion (I.e., self-kindness, common humanity and mindfulness). They identified ways of practicing each type and how critical self-talk can become a barrier for self-compassion.   Iraida Ball Psy.D.      Name: Leodan Abel YOB: 1960   MR: [id number]      Leodan was present and actively engaged in discussion. He discussed ways in which he engages in isolation and self-judgement and identified how he can move into common humanity.

## 2024-01-09 NOTE — GROUP NOTE
Group Topic: Personal Responsibility   Group Date: 1/8/2024  Start Time: 10:00 AM  End Time: 11:00 AM  Facilitators: OLVIN Anderson; OLVIN Aguila   [facility]    This was a video IOP group on a HIPAA compliant program. All patients were provided with informed consent.     Date: 1/8/2024, Time: 10-11a, Number of Patients: 9, Username: [email or web address], Number of Staff: 1  Group Topic(s): communication styles. The group began by exploring the concept of communication and identifying positive and negative examples of communication. Individuals shared their own experiences and discussed types of communication they have seen. The group continued conversation negative communication skills.    Name: Leodna Abel YOB: 1960   MR: [id number]      Leodan was present and participative. Patient was attentive and engaged. Patient provided examples of negative communication skills including body language such as rolling eyes and crossing arms.   OLVIN Wilder

## 2024-01-10 ENCOUNTER — CLINICAL SUPPORT (OUTPATIENT)
Dept: BEHAVIORAL HEALTH | Facility: CLINIC | Age: 64
End: 2024-01-10
Payer: COMMERCIAL

## 2024-01-10 DIAGNOSIS — F33.2 SEVERE RECURRENT MAJOR DEPRESSION WITHOUT PSYCHOTIC FEATURES (MULTI): ICD-10-CM

## 2024-01-10 DIAGNOSIS — F41.1 GAD (GENERALIZED ANXIETY DISORDER): ICD-10-CM

## 2024-01-10 PROCEDURE — 90853 GROUP PSYCHOTHERAPY: CPT | Mod: U2,95

## 2024-01-10 NOTE — GROUP NOTE
Group Topic: Feeling Awareness/Expression   Group Date: 1/10/2024  Start Time: 10:00 AM  End Time: 11:00 AM  Facilitators: OLVIN Aguila; OLVIN Anderson   [facility]    Number of Participants: 12   Group Focus: feeling awareness/expression  Treatment Modality: Cognitive Behavioral Therapy and Patient-Centered Therapy  Interventions utilized were exploration, group exercise, and patient education  Purpose: feelings and insight or knowledge    Name: Leodan Abel YOB: 1960   MR: [id number]    This session was conducted via HIPAA compliant video. Patient was provided with informed consent.  Date: 1/10/24  Time: 10:00 am to 11:00 pm  Group Members: 12  Number of Staff: 1  [email or web address]    Group Topic: Emotions as tied to CBT model. Physiological responses to emotions. The group briefly reviewed the cognitive behavioral model followed by a viewing of an educational TedTalk about staying present as we feel the bodily sensations attached to unpleasant emotions. The group then processed takeaways from the educational video and how the various concepts are applicable to their well-being.     Patient was present on camera. He was attentive as other group members shared takeaways and impressions from the video.     Primary Facilitator Karena Nguyen, OLVIN-S, [facility]

## 2024-01-10 NOTE — GROUP NOTE
Group Topic: Feeling Awareness/Expression   Group Date: 1/10/2024  Start Time: 11:00 AM  End Time: 12:00 PM  Facilitators: OLVIN Aguila; OLVIN Anderson   Department: [facility]IEU [facility]    Number of Participants: 12   Group Focus: feeling awareness/expression  Treatment Modality: Cognitive Behavioral Therapy and Patient-Centered Therapy  Interventions utilized were group exercise and patient education  Purpose: feelings and insight or knowledge    Name: Leodan Abel YOB: 1960   MR: [id number]    This session was conducted via HIPAA compliant video. Patient was provided with informed consent.    Date: 1/10/24  Time: 11:00 am to 12:00 pm  Group Members: 12  Number of Staff: 1  [email or web address]    Group Topic: The group focused on developing self-awareness of physical responses to various emotions such as anger, fear, sadness and happiness. Group members identified and wrote down their personal physical reactions for each emotion and shared out loud.     Patient was present on camera and was observed writing during the exercise.  He was open to sharing how these various emotions present themselves to him physically. He shared when sad he has felt his heart in his stomach, weak, numbness and foggy thinking.     Primary Facilitator - Karena Nguyen, OLVIN-S, [facility]

## 2024-01-10 NOTE — GROUP NOTE
Group Topic: Coping Skills   Group Date: 1/10/2024  Start Time:  9:00 AM  End Time: 10:00 AM  Facilitators: OLVIN Anderson; OLVIN Aguila   [facility]    This was a video IOP group on a HIPAA compliant program. All patients were provided with informed consent.     Date: 1/10/2024, Time: 9-10a, Number of Patients: 12, Username: [email or web address], Number of Staff: 1  Group Topic(s): thought records/automatic thoughts. The group engaged in exploring the topic of the cognitive model and the connection of one's thoughts, emotions and behaviors. As a whole, patients then were presented with tools for identifying automatic thoughts and applying them to a thought record. The exercise allowed patients to identify the situation, automatic thoughts, emotions and alternate responses. Alternate responses were challenged by asking evidence for and against, worst/best/most realistic outcomes, effect of the automatic thoughts and allowing for alternate perspectives. Group discussion and practice of the exercise followed.     Name: Leodan Abel YOB: 1960   MR: [id number]      Leodan was present and on topic. Patient maintained attentiveness throughout group exercise and discussion.   OLVIN Wilder

## 2024-01-11 ENCOUNTER — PHARMACY VISIT (OUTPATIENT)
Dept: PHARMACY | Facility: CLINIC | Age: 64
End: 2024-01-11
Payer: COMMERCIAL

## 2024-01-11 ENCOUNTER — CLINICAL SUPPORT (OUTPATIENT)
Dept: BEHAVIORAL HEALTH | Facility: CLINIC | Age: 64
End: 2024-01-11
Payer: COMMERCIAL

## 2024-01-11 DIAGNOSIS — F41.1 GAD (GENERALIZED ANXIETY DISORDER): ICD-10-CM

## 2024-01-11 DIAGNOSIS — F33.2 SEVERE RECURRENT MAJOR DEPRESSION WITHOUT PSYCHOTIC FEATURES (MULTI): Primary | ICD-10-CM

## 2024-01-11 PROCEDURE — RXMED WILLOW AMBULATORY MEDICATION CHARGE

## 2024-01-11 PROCEDURE — 90853 GROUP PSYCHOTHERAPY: CPT | Mod: U2,95

## 2024-01-11 NOTE — GROUP NOTE
Group Topic: Goals   Group Date: 1/11/2024  Start Time:  9:00 AM  End Time: 10:00 AM  Facilitators: OLVIN Aguila; OLVIN Anderson   Department: [facility] HIEU [facility]    Number of Participants: 10   Group Focus: activities of daily living skills, daily focus, goals, and problem solving  Treatment Modality: Behavior Modification Therapy, Cognitive Behavioral Therapy, and Patient-Centered Therapy  Interventions utilized were assignment, exploration, and problem solving  Purpose: coping skills, self-care, relapse prevention strategies, and trigger / craving management    Name: Leodan Abel YOB: 1960   MR: [id number]      This was a video IOP group on a HIPAA compliant platform. All patients were provided with informed consent.   Date: 1/11/2023  Time: 9-10a, Number of Patients: 10, User Name: [email or web address], Number of Staff: 1    Group topic(s): Group members started with a recap of the topics covered over the week. We then reviewed the SMART acronym and members identified personal goals for accomplishment, closeness to others and enjoyment for the weekend as well as potential obstacles and coping skills to use.     Leodan was present on camera and appeared attentive. He was observed writing his goals. He shared planning to shop and getting snacks for work, clean, do laundry, get scrubs and have dinner with others. He also shared planning to watch football and nap. He anticipates feeling nervous before work. He noted his nausea continues to decrease and he plans to use positivity and taking one day at a time to get him through any obstacles.     Primary Facilitator Karena Nguyen, OLVIN-S, [facility]

## 2024-01-11 NOTE — GROUP NOTE
Group Topic: Personal Responsibility   Group Date: 1/11/2024  Start Time: 10:00 AM  End Time: 11:00 AM  Facilitators: OLVIN Anderson; OLVIN Aguila   [facility]    This was a video IOP group on a HIPAA compliant program. All patients were provided with informed consent.     Date: 1/11/2024, Time: 10-11a, Number of Patients: 10, Username: [email or web address], Number of Staff: 1  Group Topic(s): communication styles. The group began by reviewing the concept of communication and negative examples of communication. The group continued conversation on positive communication skills and explored types of communicating including assertive. Group discussion followed.        Secondary Facilitator:      Name: Leodan Abel YOB: 1960   MR: [id number]      Leodan was on topic and present. Patient engaged fully and maintained attentiveness throughout group discussion.   OLVIN Wilder

## 2024-01-11 NOTE — GROUP NOTE
Group Topic: Personal Responsibility   Group Date: 1/11/2024  Start Time: 11:00 AM  End Time: 12:00 PM  Facilitators: OLVIN Anderson; OLVIN Aguila   [facility]    This was a video IOP group on a HIPAA compliant program. All patients were provided with informed consent.     Date: 1/11/2024, Time: 11a-12p, Number of Patients: 10, Username: [email or web address], Number of Staff: 1  Group Topic(s): assertiveness. The group began by continuing to discuss helpful communication skills and applying it to the communication style of assertiveness. As a whole, patients explored skills of assertiveness and group discussion on application were discussed.    Name: Leodan Abel YOB: 1960   MR: [id number]      Leodan was attentive and present. Patient followed along and maintained engagement throughout exercise and discussion.   OLVIN Wilder

## 2024-01-12 ENCOUNTER — TELEMEDICINE (OUTPATIENT)
Dept: BEHAVIORAL HEALTH | Facility: CLINIC | Age: 64
End: 2024-01-12
Payer: COMMERCIAL

## 2024-01-12 ENCOUNTER — DOCUMENTATION (OUTPATIENT)
Dept: BEHAVIORAL HEALTH | Facility: CLINIC | Age: 64
End: 2024-01-12

## 2024-01-12 DIAGNOSIS — F33.1 MODERATE EPISODE OF RECURRENT MAJOR DEPRESSIVE DISORDER (MULTI): ICD-10-CM

## 2024-01-12 PROCEDURE — 90834 PSYTX W PT 45 MINUTES: CPT | Performed by: COUNSELOR

## 2024-01-12 NOTE — PROGRESS NOTES
All Individuals Present: Patient and Provider (Encounter Provider)     ID: Danny Abel is a 63 y.o. male      Pt met with [facility] for follow-up visit for symptoms of depression.    An interactive audio and video telecommunication system which permits real time communications between the patient (at the originating site) and the provider (at the distant site) was utilized to provide this telehealth service. Verbal consent was requested and obtained from Danny Abel on this date 01/12/24 for a telehealth visit.    Pt is meeting with therapist as a part of the Personalized Care for Complex Lives Program.    Mental Status Exam  General Appearance: Well groomed, appropriate eye contact.  Attitude/Behavior: Cooperative, conversant, engaged, and with good eye contact.  Motor: No psychomotor agitation or retardation, no tremor or other abnormal movements.  Speech: Normal rate, volume, prosody  Gait/Station: Within normal limits  Mood: Normal.  Affect: Dysphoric, constricted but reactive and Congruent with mood and topic of conversation  Thought Process: Linear, goal directed  Thought Associations: No loosening of associations  Thought Content: normal  Sensorium: Alert and oriented to person, place, time and situation  Insight: Intact, as evidenced by Pt discussion  Judgment: Intact      Risk Assessment:  Imminent Risk of Suicide or Serious Self-Injury: None, denied  Imminent Risk of Violence or Homicide: None, denied    Progress Note:  Pt stated that he completed IOP and is feeling positive about his growth. He shared that he continues to try to meet someone through the dating sites, but nothing has panned out to this point. [facility] encouraged Pt to move from the free sites where people are often dishonest to the sites that have a little more control over who he meets. Pt stated that he is hoping to do that after he gets back to work. Pt noted that he is a little anxious about starting his new job on Monday, but  he feels like it will be a positive change. He has prepared himself by going to the facility to make sure he is able to navigate easily. He reported that he is looking forward to new opportunities. At this time, he will pause meeting with the [facility] for now. [facility] will discuss psychiatrist options with Dr. Kat and determine the next course of action for him.    Start Time: 2:00 pm  End Time: 2:46 pm  CPT Code: 84905    Attestation Statements   Number of minutes spent performing psychotherapy: 46

## 2024-01-12 NOTE — PROGRESS NOTES
INTENSIVE OUTPATIENT PROGRAM MULTIDISCIPLINARY  TREATMENT PLAN & PROGRESS NOTE     Patient: Danny Abel  : 1960  Age: 63     Student: No  Treatment Team Date: 24  MRN#: [id number]    Attending Physician: Dr. BENTON Lopez MD  Date of IOP Admission: 23   Ref by: Dr. Larsen  Week: 7                   Admission Scores:   DASS21: 43 ANN: 25    BDI:  45 PHQ-9: 20 MAST: 0 DAST: 0 MDQ: Negative      Current Risk Assessment:  Suicidal Risk:  None: X      Ideation:        Plan:      Intent:      SI Plan with plan contracts for safety:  Hx of harming self:        Homicidal Risk:  None: X     Ideation:       Plan:      Intent:      HI Plan with means:  Hx of harming others:          Global Improvement  Clinical Global Impressions Rating Scale Of Illness:  5  1-No Illness Present   2-Minimal   3-Mild   4-Moderate   5-Marked X   6-Severe     7-Very Severe    Diagnoses & ICD-10 Codes  Primary Diagnosis & Code: Major Depressive Disorder, Recurrent, Severe; F33.2  Secondary Diagnosis & Code: Generalized Anxiety Disorder; F41.1     Psychosocial & Environmental Stressors:   1. Dissatisfaction with work   2. Lack of social support   3. Grief/loss    Patient's Treatment Goals:  Date Initiated:           Progress Update:               1.  Attend and actively participate in IOP _4_ days/week for 3 hours per day  2023   Good X    Fair       Poor  Unclear    2.  Attend weekly medication management sessions and maintain compliance of medications  2023     Good X    Fair       Poor  Unclear                                                    3.  Identify symptoms of depression and anxiety while developing coping plans  2023   Good X    Fair       Poor  Unclear                                4.  Increase self-esteem and self-worth while increasing social connections   2023  Good X    Fair       Poor  Unclear      Current medications:  See EMR chart       Progress note:  __New to the IOP program, attending  as planned  __Compliant, Progressing & Improving - Needs more time in IOP therapy program   _X_Compliant, Progressing & Improving Planning Discharge - DISCHARGED   __Compliant, Not Progressing or Improving: Reason  __Non-Compliant, not progressing or improving: Plan  __Other:    Insurance & Benefits: [employer] [facility] [employer], IN NETWORK, BENEFITS ARE BASED ON MEDICAL NECESSITY ONLY: Unlimited visits, covers 100% of the contracted rate after deductible has been met.     Co-Pay per day: $0    DEDUCTIBLE        Single: / Family: / Accumulation:  Single: /  Family: /   CO- INSURANCE  Single: / Family: / Accumulation:  Single: /  Family: /    OUT OF POCKET  Single: 1,600  Family: / Accumulation: Single: 1,600 Family: /      Total IOP Sessions completed & authorized to date:  24    Discharge plans have been discussed with patient & medication management provider on: 1/9/2024  Reason for discharge:    _X__Successfully completed IOP treatment:   ___Pt. decided to seek treatment elsewhere:   ___Dropped out or no show more than three times:  ___Benefits exhausted:   ___Other:    Discharge date: 1/11/2024                    Discharge Prognosis: Good   Follow up appointment with: Physician: Dr. Kat   Follow up appointment with: Therapist: Dr. Larsen    Follow up Aftercare group?  Yes   Patient provided with Crisis Hotline phone number for suicide prevention? Yes     Discharge Scores: Completed    Emergency Treatment Plan Completed by patient: Yes  DASS21: 10  ANN: 4  BDI: 5  Treatment plan electronically signed by Treatment Team:   JUAN Nguyen, OLVIN-S, [facility], MEJIA Somers, OLVIN, JUAN Ball, PsSammi, JUAN Perla, APRN-CNP

## 2024-01-15 ENCOUNTER — APPOINTMENT (OUTPATIENT)
Dept: BEHAVIORAL HEALTH | Facility: CLINIC | Age: 64
End: 2024-01-15
Payer: COMMERCIAL

## 2024-01-15 DIAGNOSIS — Z86.59 HISTORY OF DEPRESSION: ICD-10-CM

## 2024-01-16 ENCOUNTER — APPOINTMENT (OUTPATIENT)
Dept: BEHAVIORAL HEALTH | Facility: CLINIC | Age: 64
End: 2024-01-16
Payer: COMMERCIAL

## 2024-01-17 ENCOUNTER — APPOINTMENT (OUTPATIENT)
Dept: BEHAVIORAL HEALTH | Facility: CLINIC | Age: 64
End: 2024-01-17
Payer: COMMERCIAL

## 2024-01-18 ENCOUNTER — APPOINTMENT (OUTPATIENT)
Dept: BEHAVIORAL HEALTH | Facility: CLINIC | Age: 64
End: 2024-01-18
Payer: COMMERCIAL

## 2024-04-11 PROCEDURE — RXMED WILLOW AMBULATORY MEDICATION CHARGE

## 2024-04-12 ENCOUNTER — PHARMACY VISIT (OUTPATIENT)
Dept: PHARMACY | Facility: CLINIC | Age: 64
End: 2024-04-12
Payer: COMMERCIAL

## 2024-05-11 ENCOUNTER — TELEMEDICINE (OUTPATIENT)
Dept: BEHAVIORAL HEALTH | Facility: CLINIC | Age: 64
End: 2024-05-11
Payer: COMMERCIAL

## 2024-05-11 DIAGNOSIS — F33.0 MILD EPISODE OF RECURRENT MAJOR DEPRESSIVE DISORDER (CMS-HCC): ICD-10-CM

## 2024-05-11 DIAGNOSIS — F41.9 ANXIETY: ICD-10-CM

## 2024-05-11 PROCEDURE — 4010F ACE/ARB THERAPY RXD/TAKEN: CPT | Performed by: PSYCHOLOGIST

## 2024-05-11 PROCEDURE — 90837 PSYTX W PT 60 MINUTES: CPT | Performed by: PSYCHOLOGIST

## 2024-05-12 NOTE — PROGRESS NOTES
1 PM [id number] Indiv Psych for MDD, Recurrent and Anxiety (60 MIN, [phone])  Virtual. Supportive Therapy. Last seen in mid-Nov, 2023 just before he started IOP. More stable. Taking bupropion. 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 Busy but likes better than [place] [employer]. Now on 9-5:30 shift vs night shift. Works Monday through Friday and one Saturday a month. Discussed some issues regarding a 's job he applied then withdrew application, got bad vibes regarding job. Felt IOP was helpful. Moved to new apartment last week in [place], likes better even though more expensive. Still getting together with friends (e.g. Norah). Friend Sherly helped him move.  Invited to niece's master degree graduation but wont' go because he's  to man whose father patient used to work with and doesn't care for. Kidney stones, taking meds and needs to drink more water. Next: 2-3 weeks.

## 2024-06-20 ASSESSMENT — ENCOUNTER SYMPTOMS
HEADACHES: 0
NECK PAIN: 0
PALPITATIONS: 0
ORTHOPNEA: 0
HYPERTENSION: 1
BLURRED VISION: 0
SHORTNESS OF BREATH: 0
SWEATS: 0
PND: 0

## 2024-06-21 ENCOUNTER — APPOINTMENT (OUTPATIENT)
Dept: PRIMARY CARE | Facility: CLINIC | Age: 64
End: 2024-06-21
Payer: COMMERCIAL

## 2024-06-21 VITALS
HEART RATE: 84 BPM | BODY MASS INDEX: 34.37 KG/M2 | WEIGHT: 219 LBS | TEMPERATURE: 98 F | SYSTOLIC BLOOD PRESSURE: 157 MMHG | DIASTOLIC BLOOD PRESSURE: 93 MMHG | HEIGHT: 67 IN

## 2024-06-21 DIAGNOSIS — E11.69 ERECTILE DYSFUNCTION DUE TO TYPE 2 DIABETES MELLITUS (MULTI): ICD-10-CM

## 2024-06-21 DIAGNOSIS — Z79.899 CONTROLLED SUBSTANCE AGREEMENT SIGNED: ICD-10-CM

## 2024-06-21 DIAGNOSIS — Z96.641 PRESENCE OF RIGHT ARTIFICIAL HIP JOINT: ICD-10-CM

## 2024-06-21 DIAGNOSIS — E11.36: ICD-10-CM

## 2024-06-21 DIAGNOSIS — L70.0 ACNE VULGARIS: ICD-10-CM

## 2024-06-21 DIAGNOSIS — I10 PRIMARY HYPERTENSION: ICD-10-CM

## 2024-06-21 DIAGNOSIS — E78.2 MIXED HYPERLIPIDEMIA: ICD-10-CM

## 2024-06-21 DIAGNOSIS — N52.1 ERECTILE DYSFUNCTION DUE TO TYPE 2 DIABETES MELLITUS (MULTI): ICD-10-CM

## 2024-06-21 DIAGNOSIS — F41.1 GAD (GENERALIZED ANXIETY DISORDER): Primary | ICD-10-CM

## 2024-06-21 DIAGNOSIS — K21.9 GASTROESOPHAGEAL REFLUX DISEASE WITHOUT ESOPHAGITIS: ICD-10-CM

## 2024-06-21 LAB
AMPHETAMINES UR QL SCN: NORMAL
BARBITURATES UR QL SCN: NORMAL
BENZODIAZ UR QL SCN: NORMAL
BZE UR QL SCN: NORMAL
CANNABINOIDS UR QL SCN: NORMAL
CREAT UR-MCNC: 22.5 MG/DL (ref 20–370)
FENTANYL+NORFENTANYL UR QL SCN: NORMAL
METHADONE UR QL SCN: NORMAL
MICROALBUMIN UR-MCNC: <7 MG/L
MICROALBUMIN/CREAT UR: NORMAL MG/G{CREAT}
OPIATES UR QL SCN: NORMAL
OXYCODONE+OXYMORPHONE UR QL SCN: NORMAL
PCP UR QL SCN: NORMAL

## 2024-06-21 PROCEDURE — 80307 DRUG TEST PRSMV CHEM ANLYZR: CPT

## 2024-06-21 PROCEDURE — 3077F SYST BP >= 140 MM HG: CPT | Performed by: INTERNAL MEDICINE

## 2024-06-21 PROCEDURE — 3080F DIAST BP >= 90 MM HG: CPT | Performed by: INTERNAL MEDICINE

## 2024-06-21 PROCEDURE — 99214 OFFICE O/P EST MOD 30 MIN: CPT | Performed by: INTERNAL MEDICINE

## 2024-06-21 PROCEDURE — 82043 UR ALBUMIN QUANTITATIVE: CPT

## 2024-06-21 PROCEDURE — 3062F POS MACROALBUMINURIA REV: CPT | Performed by: INTERNAL MEDICINE

## 2024-06-21 PROCEDURE — 82570 ASSAY OF URINE CREATININE: CPT

## 2024-06-21 PROCEDURE — 4010F ACE/ARB THERAPY RXD/TAKEN: CPT | Performed by: INTERNAL MEDICINE

## 2024-06-21 RX ORDER — ATORVASTATIN CALCIUM 20 MG/1
20 TABLET, FILM COATED ORAL DAILY
Qty: 90 TABLET | Refills: 1 | Status: SHIPPED | OUTPATIENT
Start: 2024-06-21

## 2024-06-21 RX ORDER — LOSARTAN POTASSIUM 100 MG/1
100 TABLET ORAL DAILY
Qty: 90 TABLET | Refills: 1 | Status: SHIPPED | OUTPATIENT
Start: 2024-06-21 | End: 2024-12-18

## 2024-06-21 RX ORDER — SILDENAFIL 100 MG/1
100 TABLET, FILM COATED ORAL DAILY
Qty: 90 TABLET | Refills: 1 | Status: SHIPPED | OUTPATIENT
Start: 2024-06-21 | End: 2024-12-18

## 2024-06-21 RX ORDER — DOXYCYCLINE 100 MG/1
100 CAPSULE ORAL DAILY
Qty: 90 CAPSULE | Refills: 1 | Status: SHIPPED | OUTPATIENT
Start: 2024-06-21

## 2024-06-21 RX ORDER — METOPROLOL TARTRATE 100 MG/1
100 TABLET ORAL 2 TIMES DAILY
Qty: 180 TABLET | Refills: 1 | Status: SHIPPED | OUTPATIENT
Start: 2024-06-21

## 2024-06-21 RX ORDER — CLONAZEPAM 0.5 MG/1
0.5 TABLET ORAL 2 TIMES DAILY PRN
Qty: 60 TABLET | Refills: 2 | Status: SHIPPED | OUTPATIENT
Start: 2024-06-21 | End: 2024-09-19

## 2024-06-21 RX ORDER — BUPROPION HYDROCHLORIDE 100 MG/1
100 TABLET, EXTENDED RELEASE ORAL DAILY
Qty: 90 TABLET | Refills: 1 | Status: SHIPPED | OUTPATIENT
Start: 2024-06-21 | End: 2024-12-18

## 2024-06-21 RX ORDER — METHOCARBAMOL 500 MG/1
500 TABLET, FILM COATED ORAL 2 TIMES DAILY
Qty: 180 TABLET | Refills: 1 | Status: SHIPPED | OUTPATIENT
Start: 2024-06-21

## 2024-06-21 RX ORDER — BUSPIRONE HYDROCHLORIDE 30 MG/1
30 TABLET ORAL 2 TIMES DAILY
Qty: 180 TABLET | Refills: 1 | Status: SHIPPED | OUTPATIENT
Start: 2024-06-21 | End: 2024-12-18

## 2024-06-21 RX ORDER — PANTOPRAZOLE SODIUM 40 MG/1
40 TABLET, DELAYED RELEASE ORAL DAILY
Qty: 90 TABLET | Refills: 1 | Status: SHIPPED | OUTPATIENT
Start: 2024-06-21

## 2024-06-21 ASSESSMENT — ENCOUNTER SYMPTOMS
NECK PAIN: 0
HYPERTENSION: 1
BLURRED VISION: 0
PND: 0
HEADACHES: 0
PALPITATIONS: 0
SHORTNESS OF BREATH: 0
ORTHOPNEA: 0
DEPRESSION: 0
LOSS OF SENSATION IN FEET: 0
OCCASIONAL FEELINGS OF UNSTEADINESS: 0
SWEATS: 0

## 2024-06-21 ASSESSMENT — PATIENT HEALTH QUESTIONNAIRE - PHQ9
1. LITTLE INTEREST OR PLEASURE IN DOING THINGS: NOT AT ALL
2. FEELING DOWN, DEPRESSED OR HOPELESS: NOT AT ALL
SUM OF ALL RESPONSES TO PHQ9 QUESTIONS 1 AND 2: 0

## 2024-06-21 NOTE — PROGRESS NOTES
Subjective   Patient ID: Leodan Abel is a 63 y.o. male who presents for Establish Care and Med Refill.    Hypertension  This is a recurrent problem. The current episode started more than 1 year ago. The problem is unchanged. The problem is controlled. Pertinent negatives include no anxiety, blurred vision, chest pain, headaches, malaise/fatigue, neck pain, orthopnea, palpitations, peripheral edema, PND, shortness of breath or sweats. Risk factors for coronary artery disease include diabetes mellitus, dyslipidemia, obesity and stress. There are no compliance problems.      Patient is being treated for depression and anxiety.     OARRS:  Luis Warren, DO on 6/21/2024  9:54 AM  I have personally reviewed the OARRS report for Danny Abel. I have considered the risks of abuse, dependence, addiction and diversion    Is the patient prescribed a combination of a benzodiazepine and opioid?  No    Last Urine Drug Screen / ordered today: Yes  No results found for this or any previous visit (from the past 8760 hour(s)).  N/A        Controlled Substance Agreement:  Date of the Last Agreement: 6/21/2024  Reviewed Controlled Substance Agreement including but not limited to the benefits, risks, and alternatives to treatment with a Controlled Substance medication(s).    Benzodiazepines:  What is the patient's goal of therapy? Treat anxiety  Is this being achieved with current treatment? yes    SANDOR-7:  No data recorded    Activities of Daily Living:   Is your overall impression that this patient is benefiting (symptom reduction outweighs side effects) from benzodiazepine therapy? Yes     1. Physical Functioning: Same  2. Family Relationship: Same  3. Social Relationship: Same  4. Mood: Same  5. Sleep Patterns: Same  6. Overall Function: Same      Review of Systems   Constitutional:  Negative for malaise/fatigue.   Eyes:  Negative for blurred vision.   Respiratory:  Negative for shortness of breath.   "  Cardiovascular:  Negative for chest pain, palpitations, orthopnea and PND.   Musculoskeletal:  Negative for neck pain.   Neurological:  Negative for headaches.       Objective   BP (!) 157/93   Pulse 84   Temp 36.7 °C (98 °F) (Temporal)   Ht 1.702 m (5' 7\")   Wt 99.3 kg (219 lb)   BMI 34.30 kg/m²     Physical Exam    Assessment/Plan   Problem List Items Addressed This Visit             ICD-10-CM    Diabetes mellitus with cataract (Multi) E11.36    Relevant Orders    Hemoglobin A1C    Albumin , Urine Random (Completed)    Erectile dysfunction due to type 2 diabetes mellitus (Multi) E11.69, N52.1    Relevant Medications    sildenafil (Viagra) 100 mg tablet    SANDOR (generalized anxiety disorder) - Primary F41.1    Relevant Medications    busPIRone (Buspar) 30 mg tablet    buPROPion SR (Wellbutrin SR) 100 mg 12 hr tablet    clonazePAM (KlonoPIN) 0.5 mg tablet    GERD (gastroesophageal reflux disease) K21.9    Relevant Medications    pantoprazole (ProtoNix) 40 mg EC tablet    Hypertension I10    Relevant Medications    losartan (Cozaar) 100 mg tablet    metoprolol tartrate (Lopressor) 100 mg tablet    Mixed hyperlipidemia E78.2    Relevant Medications    atorvastatin (Lipitor) 20 mg tablet    Other Relevant Orders    Comprehensive metabolic panel    Lipid panel    Presence of right artificial hip joint Z96.641    Relevant Medications    methocarbamol (Robaxin) 500 mg tablet    Acne vulgaris L70.0    Relevant Medications    doxycycline (Vibramycin) 100 mg capsule    Controlled substance agreement signed Z79.89    Relevant Orders    Drug Screen, Urine With Reflex to Confirmation (Completed)          "

## 2024-06-22 ENCOUNTER — APPOINTMENT (OUTPATIENT)
Dept: BEHAVIORAL HEALTH | Facility: CLINIC | Age: 64
End: 2024-06-22
Payer: COMMERCIAL

## 2024-06-22 DIAGNOSIS — F33.0 MILD EPISODE OF RECURRENT MAJOR DEPRESSIVE DISORDER (CMS-HCC): ICD-10-CM

## 2024-06-22 DIAGNOSIS — F41.9 ANXIETY: ICD-10-CM

## 2024-06-22 PROCEDURE — 4010F ACE/ARB THERAPY RXD/TAKEN: CPT | Performed by: PSYCHOLOGIST

## 2024-06-22 PROCEDURE — 90837 PSYTX W PT 60 MINUTES: CPT | Performed by: PSYCHOLOGIST

## 2024-06-22 PROCEDURE — 3062F POS MACROALBUMINURIA REV: CPT | Performed by: PSYCHOLOGIST

## 2024-06-22 NOTE — PROGRESS NOTES
"12 PM 23635 Indiv Psych for MDD, Recurrent and Anxiety (60 MIN, 1202-100)  Virtual. Supportive Therapy. Making some modifications to meds, currently on bupropion, duloxetine and buspirone. Some concerns combination making him more irritable/reactive, \"on edge.\" Feels he's on too much meds. Last A1C was 8, said below 7 is normal. Discussed toll job is taking. 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 Discussed some difficulties with communication with supervisor. Said his boss told him she needs to be aware if he has FMLA.  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 Still getting out with friend Norah, liking new apartment even though more expensive. Next: 1 month.   "

## 2024-07-15 ENCOUNTER — APPOINTMENT (OUTPATIENT)
Dept: BEHAVIORAL HEALTH | Facility: CLINIC | Age: 64
End: 2024-07-15
Payer: COMMERCIAL

## 2024-07-20 ENCOUNTER — PHARMACY VISIT (OUTPATIENT)
Dept: PHARMACY | Facility: CLINIC | Age: 64
End: 2024-07-20
Payer: COMMERCIAL

## 2024-07-20 ENCOUNTER — APPOINTMENT (OUTPATIENT)
Dept: BEHAVIORAL HEALTH | Facility: CLINIC | Age: 64
End: 2024-07-20
Payer: COMMERCIAL

## 2024-07-20 DIAGNOSIS — F33.0 MILD EPISODE OF RECURRENT MAJOR DEPRESSIVE DISORDER (CMS-HCC): ICD-10-CM

## 2024-07-20 DIAGNOSIS — F41.9 ANXIETY: ICD-10-CM

## 2024-07-20 PROCEDURE — 3062F POS MACROALBUMINURIA REV: CPT | Performed by: PSYCHOLOGIST

## 2024-07-20 PROCEDURE — 4010F ACE/ARB THERAPY RXD/TAKEN: CPT | Performed by: PSYCHOLOGIST

## 2024-07-20 PROCEDURE — 90837 PSYTX W PT 60 MINUTES: CPT | Performed by: PSYCHOLOGIST

## 2024-07-20 PROCEDURE — RXMED WILLOW AMBULATORY MEDICATION CHARGE

## 2024-07-21 NOTE — PROGRESS NOTES
1 PM [id number] Indiv Psych for MDD, Recurrent and Anxiety (60 MIN, 105-200)  Virtual. Supportive Therapy. Spent most of time discussing his decision to interview for jobs, accepted job at [employer], to start Aug 26. Had several interviews, [employer], [employer] and [employer]. Position at [employer] was for a . 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 Will get paid more. As a result, health coverage will change and he will need to get new providers who accept [employer] [facility] [employer] insurance. Still getting out with friends, sister and niece visiting tomorrow. Reported stable mood, still depressed. Job at  too physically demanding. Next: 1 month.

## 2024-08-13 ENCOUNTER — OFFICE VISIT (OUTPATIENT)
Dept: PRIMARY CARE | Facility: CLINIC | Age: 64
End: 2024-08-13
Payer: COMMERCIAL

## 2024-08-13 VITALS
BODY MASS INDEX: 34.21 KG/M2 | DIASTOLIC BLOOD PRESSURE: 90 MMHG | HEART RATE: 81 BPM | WEIGHT: 218 LBS | HEIGHT: 67 IN | SYSTOLIC BLOOD PRESSURE: 140 MMHG

## 2024-08-13 DIAGNOSIS — E11.69 ERECTILE DYSFUNCTION DUE TO TYPE 2 DIABETES MELLITUS (MULTI): ICD-10-CM

## 2024-08-13 DIAGNOSIS — I10 PRIMARY HYPERTENSION: ICD-10-CM

## 2024-08-13 DIAGNOSIS — L70.0 ACNE VULGARIS: ICD-10-CM

## 2024-08-13 DIAGNOSIS — N52.1 ERECTILE DYSFUNCTION DUE TO TYPE 2 DIABETES MELLITUS (MULTI): ICD-10-CM

## 2024-08-13 DIAGNOSIS — F41.1 GAD (GENERALIZED ANXIETY DISORDER): ICD-10-CM

## 2024-08-13 DIAGNOSIS — E11.36: ICD-10-CM

## 2024-08-13 DIAGNOSIS — K21.9 GASTROESOPHAGEAL REFLUX DISEASE WITHOUT ESOPHAGITIS: ICD-10-CM

## 2024-08-13 DIAGNOSIS — Z96.641 PRESENCE OF RIGHT ARTIFICIAL HIP JOINT: ICD-10-CM

## 2024-08-13 DIAGNOSIS — E78.2 MIXED HYPERLIPIDEMIA: ICD-10-CM

## 2024-08-13 DIAGNOSIS — F33.1 MODERATE EPISODE OF RECURRENT MAJOR DEPRESSIVE DISORDER (MULTI): ICD-10-CM

## 2024-08-13 PROCEDURE — 4010F ACE/ARB THERAPY RXD/TAKEN: CPT | Performed by: FAMILY MEDICINE

## 2024-08-13 PROCEDURE — 3080F DIAST BP >= 90 MM HG: CPT | Performed by: FAMILY MEDICINE

## 2024-08-13 PROCEDURE — 3077F SYST BP >= 140 MM HG: CPT | Performed by: FAMILY MEDICINE

## 2024-08-13 PROCEDURE — 3008F BODY MASS INDEX DOCD: CPT | Performed by: FAMILY MEDICINE

## 2024-08-13 PROCEDURE — 3062F POS MACROALBUMINURIA REV: CPT | Performed by: FAMILY MEDICINE

## 2024-08-13 PROCEDURE — 1036F TOBACCO NON-USER: CPT | Performed by: FAMILY MEDICINE

## 2024-08-13 PROCEDURE — 99214 OFFICE O/P EST MOD 30 MIN: CPT | Performed by: FAMILY MEDICINE

## 2024-08-13 RX ORDER — SILDENAFIL 100 MG/1
100 TABLET, FILM COATED ORAL AS NEEDED
Qty: 30 TABLET | Refills: 1 | Status: SHIPPED | OUTPATIENT
Start: 2024-08-13 | End: 2024-10-12

## 2024-08-13 RX ORDER — LOSARTAN POTASSIUM 100 MG/1
100 TABLET ORAL DAILY
Qty: 90 TABLET | Refills: 0 | Status: SHIPPED | OUTPATIENT
Start: 2024-08-13 | End: 2024-11-11

## 2024-08-13 RX ORDER — METHOCARBAMOL 500 MG/1
500 TABLET, FILM COATED ORAL 2 TIMES DAILY
Qty: 180 TABLET | Refills: 0 | Status: SHIPPED | OUTPATIENT
Start: 2024-08-13

## 2024-08-13 RX ORDER — BUSPIRONE HYDROCHLORIDE 30 MG/1
30 TABLET ORAL 2 TIMES DAILY
Qty: 180 TABLET | Refills: 0 | Status: SHIPPED | OUTPATIENT
Start: 2024-08-13 | End: 2024-11-11

## 2024-08-13 RX ORDER — PANTOPRAZOLE SODIUM 40 MG/1
40 TABLET, DELAYED RELEASE ORAL DAILY
Qty: 90 TABLET | Refills: 0 | Status: SHIPPED | OUTPATIENT
Start: 2024-08-13 | End: 2024-11-11

## 2024-08-13 RX ORDER — AMOXICILLIN 500 MG/1
CAPSULE ORAL
COMMUNITY
Start: 2024-06-23 | End: 2024-08-13 | Stop reason: WASHOUT

## 2024-08-13 RX ORDER — METOPROLOL TARTRATE 100 MG/1
100 TABLET ORAL 2 TIMES DAILY
Qty: 180 TABLET | Refills: 0 | Status: SHIPPED | OUTPATIENT
Start: 2024-08-13

## 2024-08-13 RX ORDER — ATORVASTATIN CALCIUM 20 MG/1
20 TABLET, FILM COATED ORAL DAILY
Qty: 90 TABLET | Refills: 0 | Status: SHIPPED | OUTPATIENT
Start: 2024-08-13 | End: 2024-11-11

## 2024-08-13 RX ORDER — DULOXETIN HYDROCHLORIDE 60 MG/1
60 CAPSULE, DELAYED RELEASE ORAL DAILY
Qty: 90 CAPSULE | Refills: 0 | Status: SHIPPED | OUTPATIENT
Start: 2024-08-13 | End: 2024-11-11

## 2024-08-13 RX ORDER — DOXYCYCLINE 100 MG/1
100 CAPSULE ORAL DAILY
Qty: 90 CAPSULE | Refills: 0 | Status: SHIPPED | OUTPATIENT
Start: 2024-08-13 | End: 2024-11-11

## 2024-08-13 ASSESSMENT — ENCOUNTER SYMPTOMS
AGITATION: 1
FATIGUE: 0
ACTIVITY CHANGE: 0
CHOKING: 0
PALPITATIONS: 0
FEVER: 0
CHEST TIGHTNESS: 0
HYPERACTIVE: 1

## 2024-08-13 ASSESSMENT — PATIENT HEALTH QUESTIONNAIRE - PHQ9
SUM OF ALL RESPONSES TO PHQ9 QUESTIONS 1 AND 2: 0
2. FEELING DOWN, DEPRESSED OR HOPELESS: NOT AT ALL
1. LITTLE INTEREST OR PLEASURE IN DOING THINGS: NOT AT ALL

## 2024-08-13 NOTE — PROGRESS NOTES
"Subjective   Patient ID: Leodan Abel is a 63 y.o. male who presents for Med Refill.    Depression   - reports he has been taking his wellbutrin and buspar daily to help manage his symptoms   - did have improvement on symptoms taking wellbutrin, but was having significant issues with edginess or feeling irritable   - had the dose decreased and still having side effects   - regimen also includes cymbalta, and buspar   - denies any significant depression     Primary Hypertension   - reports he has been stable on his medication for several years   - reports he checks bp at home and gets ranges in the 130's/80's   - reports no headaches, no chest pain, no shortness of breath   - does work on staying active and trying to eat healthy when he can   - does have a diet significant in sodium     Rosacea   - takes doxycycline regularly   - reports the medication controls symptoms well   - denies any side effects or issues          Review of Systems   Constitutional:  Negative for activity change, fatigue and fever.   Respiratory:  Negative for choking and chest tightness.    Cardiovascular:  Negative for chest pain and palpitations.   Psychiatric/Behavioral:  Positive for agitation. The patient is hyperactive.        Objective   /90   Pulse 81   Ht 1.702 m (5' 7\")   Wt 98.9 kg (218 lb)   BMI 34.14 kg/m²     Physical Exam  Constitutional:       General: He is not in acute distress.     Appearance: Normal appearance.   HENT:      Mouth/Throat:      Mouth: Mucous membranes are moist.      Pharynx: Oropharynx is clear.   Cardiovascular:      Rate and Rhythm: Normal rate and regular rhythm.   Pulmonary:      Effort: No respiratory distress.      Breath sounds: No stridor. No wheezing.   Neurological:      Mental Status: He is alert.   Psychiatric:         Mood and Affect: Mood normal.         Behavior: Behavior normal.         Assessment/Plan   Problem List Items Addressed This Visit             ICD-10-CM       " Cardiac and Vasculature    Hypertension I10     Stable   - BP near goal   - refilled medication today   - instructions on reduced sodium diet   - f/u 3 months          Relevant Medications    losartan (Cozaar) 100 mg tablet    metoprolol tartrate (Lopressor) 100 mg tablet    Mixed hyperlipidemia E78.2     Stable   - medication refilled today   - f/u 3 months          Relevant Medications    atorvastatin (Lipitor) 20 mg tablet       Endocrine/Metabolic    Diabetes mellitus with cataract (Multi) E11.36     Stable   - refilled medication today   - instructed to get A1c rechecked with lab order   - f/u 3 months          Relevant Medications    empagliflozin (Jardiance) 25 mg       Gastrointestinal and Abdominal    GERD (gastroesophageal reflux disease) K21.9     Stable   - refilled medication today   - f/u 3 months          Relevant Medications    pantoprazole (ProtoNix) 40 mg EC tablet       Genitourinary and Reproductive    Erectile dysfunction due to type 2 diabetes mellitus (Multi) E11.69, N52.1     Stable   - refilled medication today   - f/u 3 months          Relevant Medications    sildenafil (Viagra) 100 mg tablet       Mental Health    SANDOR (generalized anxiety disorder) F41.1    Relevant Medications    busPIRone (Buspar) 30 mg tablet    DULoxetine (Cymbalta) 60 mg DR capsule    Moderate episode of recurrent major depressive disorder (Multi) F33.1    Relevant Medications    DULoxetine (Cymbalta) 60 mg DR capsule       Musculoskeletal and Injuries    Presence of right artificial hip joint Z96.641    Relevant Medications    methocarbamol (Robaxin) 500 mg tablet       Skin    Acne vulgaris L70.0    Relevant Medications    doxycycline (Vibramycin) 100 mg capsule

## 2024-08-13 NOTE — ASSESSMENT & PLAN NOTE
Stable   - BP near goal   - refilled medication today   - instructions on reduced sodium diet   - f/u 3 months

## 2024-08-13 NOTE — ASSESSMENT & PLAN NOTE
Stable   - refilled medication today   - instructed to get A1c rechecked with lab order   - f/u 3 months

## 2024-08-16 ENCOUNTER — APPOINTMENT (OUTPATIENT)
Dept: PRIMARY CARE | Facility: CLINIC | Age: 64
End: 2024-08-16
Payer: COMMERCIAL

## 2024-08-17 ENCOUNTER — APPOINTMENT (OUTPATIENT)
Dept: BEHAVIORAL HEALTH | Facility: CLINIC | Age: 64
End: 2024-08-17
Payer: COMMERCIAL

## 2024-08-17 DIAGNOSIS — F41.9 ANXIETY: ICD-10-CM

## 2024-08-17 DIAGNOSIS — F33.0 MILD EPISODE OF RECURRENT MAJOR DEPRESSIVE DISORDER (CMS-HCC): ICD-10-CM

## 2024-08-17 PROCEDURE — 3062F POS MACROALBUMINURIA REV: CPT | Performed by: PSYCHOLOGIST

## 2024-08-17 PROCEDURE — 90837 PSYTX W PT 60 MINUTES: CPT | Performed by: PSYCHOLOGIST

## 2024-08-17 PROCEDURE — 4010F ACE/ARB THERAPY RXD/TAKEN: CPT | Performed by: PSYCHOLOGIST

## 2024-08-17 NOTE — PROGRESS NOTES
1 PM 74056 Indiv Psych for MDD, Recurrent and Anxiety (60 MIN, 103-156)  Virtual. Supportive Therapy. Mood stable.  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  As a result his insurance will be changed.  Reported some difficulties with his immediate supervisor upon leaving.  Overall feeling good about the new opportunity which starts August 26.  Pharmacologist also has taken him off of Wellbutrin and is noted that his irritability has improved.  Still on Cymbalta.  Indicated that he wants to be careful that he does not  want to get into the habit of saying what ever he thinks when he starts his new job.  Will continue to keep me updated.  Terminated warmly.

## 2024-09-20 ENCOUNTER — APPOINTMENT (OUTPATIENT)
Dept: PRIMARY CARE | Facility: CLINIC | Age: 64
End: 2024-09-20
Payer: COMMERCIAL

## 2024-09-20 PROBLEM — F33.9 RECURRENT DEPRESSIVE DISORDER (CMS-HCC): Status: ACTIVE | Noted: 2024-09-20

## 2024-10-28 DIAGNOSIS — F41.1 GAD (GENERALIZED ANXIETY DISORDER): ICD-10-CM

## 2024-10-28 DIAGNOSIS — F33.1 MODERATE EPISODE OF RECURRENT MAJOR DEPRESSIVE DISORDER: ICD-10-CM

## 2024-10-28 DIAGNOSIS — Z96.641 PRESENCE OF RIGHT ARTIFICIAL HIP JOINT: ICD-10-CM

## 2024-10-30 RX ORDER — METHOCARBAMOL 500 MG/1
500 TABLET, FILM COATED ORAL 2 TIMES DAILY
Qty: 180 TABLET | Refills: 0 | Status: SHIPPED | OUTPATIENT
Start: 2024-10-30

## 2024-11-13 RX ORDER — DULOXETIN HYDROCHLORIDE 60 MG/1
60 CAPSULE, DELAYED RELEASE ORAL DAILY
Qty: 90 CAPSULE | Refills: 0 | Status: SHIPPED | OUTPATIENT
Start: 2024-11-13

## 2024-12-17 DIAGNOSIS — F41.1 GAD (GENERALIZED ANXIETY DISORDER): ICD-10-CM

## 2024-12-23 RX ORDER — CLONAZEPAM 0.5 MG/1
TABLET ORAL
Qty: 60 TABLET | Refills: 0 | OUTPATIENT
Start: 2024-12-23

## 2024-12-23 NOTE — TELEPHONE ENCOUNTER
This patient doesn't even have an appointment on the books. He will need to be seen for refills on controlled substance per  policy. For clonazepam we have to see him every 3 months per  policy.

## 2025-01-08 ASSESSMENT — ENCOUNTER SYMPTOMS
PND: 0
SWEATS: 0
PALPITATIONS: 0
ORTHOPNEA: 0
HYPERTENSION: 1
BLURRED VISION: 0
HEADACHES: 0
NECK PAIN: 0
SHORTNESS OF BREATH: 0

## 2025-01-14 ENCOUNTER — APPOINTMENT (OUTPATIENT)
Dept: PRIMARY CARE | Facility: CLINIC | Age: 65
End: 2025-01-14
Payer: COMMERCIAL

## 2025-01-14 VITALS
SYSTOLIC BLOOD PRESSURE: 126 MMHG | DIASTOLIC BLOOD PRESSURE: 71 MMHG | HEART RATE: 85 BPM | WEIGHT: 209 LBS | BODY MASS INDEX: 32.73 KG/M2 | TEMPERATURE: 97.9 F

## 2025-01-14 DIAGNOSIS — E11.36 DIABETES MELLITUS WITH CATARACT: ICD-10-CM

## 2025-01-14 DIAGNOSIS — Z96.641 PRESENCE OF RIGHT ARTIFICIAL HIP JOINT: ICD-10-CM

## 2025-01-14 DIAGNOSIS — K21.9 GASTROESOPHAGEAL REFLUX DISEASE WITHOUT ESOPHAGITIS: ICD-10-CM

## 2025-01-14 DIAGNOSIS — I10 PRIMARY HYPERTENSION: Primary | ICD-10-CM

## 2025-01-14 DIAGNOSIS — R07.89 CHEST TIGHTNESS: ICD-10-CM

## 2025-01-14 DIAGNOSIS — L71.9 ROSACEA, ACNE: ICD-10-CM

## 2025-01-14 DIAGNOSIS — E78.2 MIXED HYPERLIPIDEMIA: ICD-10-CM

## 2025-01-14 DIAGNOSIS — Z12.5 PROSTATE CANCER SCREENING: ICD-10-CM

## 2025-01-14 DIAGNOSIS — F33.1 MODERATE EPISODE OF RECURRENT MAJOR DEPRESSIVE DISORDER: ICD-10-CM

## 2025-01-14 DIAGNOSIS — R00.0 RAPID HEARTBEAT: ICD-10-CM

## 2025-01-14 DIAGNOSIS — Z12.11 COLON CANCER SCREENING: ICD-10-CM

## 2025-01-14 DIAGNOSIS — L70.0 ACNE VULGARIS: ICD-10-CM

## 2025-01-14 DIAGNOSIS — E55.9 VITAMIN D DEFICIENCY: ICD-10-CM

## 2025-01-14 DIAGNOSIS — F41.1 GAD (GENERALIZED ANXIETY DISORDER): ICD-10-CM

## 2025-01-14 PROCEDURE — 4010F ACE/ARB THERAPY RXD/TAKEN: CPT | Performed by: INTERNAL MEDICINE

## 2025-01-14 PROCEDURE — 3074F SYST BP LT 130 MM HG: CPT | Performed by: INTERNAL MEDICINE

## 2025-01-14 PROCEDURE — 99213 OFFICE O/P EST LOW 20 MIN: CPT | Performed by: INTERNAL MEDICINE

## 2025-01-14 PROCEDURE — 3078F DIAST BP <80 MM HG: CPT | Performed by: INTERNAL MEDICINE

## 2025-01-14 RX ORDER — METOPROLOL TARTRATE 100 MG/1
100 TABLET ORAL 2 TIMES DAILY
Qty: 180 TABLET | Refills: 1 | Status: SHIPPED | OUTPATIENT
Start: 2025-01-14

## 2025-01-14 RX ORDER — METHOCARBAMOL 500 MG/1
500 TABLET, FILM COATED ORAL NIGHTLY PRN
Qty: 90 TABLET | Refills: 1 | Status: SHIPPED | OUTPATIENT
Start: 2025-01-14 | End: 2025-01-14 | Stop reason: SDUPTHER

## 2025-01-14 RX ORDER — DOXYCYCLINE 100 MG/1
100 CAPSULE ORAL DAILY
Qty: 90 CAPSULE | Refills: 1 | Status: SHIPPED | OUTPATIENT
Start: 2025-01-14 | End: 2025-07-13

## 2025-01-14 RX ORDER — PANTOPRAZOLE SODIUM 40 MG/1
40 TABLET, DELAYED RELEASE ORAL DAILY
Qty: 90 TABLET | Refills: 1 | Status: SHIPPED | OUTPATIENT
Start: 2025-01-14 | End: 2025-07-13

## 2025-01-14 RX ORDER — LOSARTAN POTASSIUM 100 MG/1
100 TABLET ORAL DAILY
Qty: 90 TABLET | Refills: 1 | Status: SHIPPED | OUTPATIENT
Start: 2025-01-14 | End: 2025-07-13

## 2025-01-14 RX ORDER — ATORVASTATIN CALCIUM 20 MG/1
20 TABLET, FILM COATED ORAL DAILY
Qty: 90 TABLET | Refills: 1 | Status: SHIPPED | OUTPATIENT
Start: 2025-01-14 | End: 2025-07-13

## 2025-01-14 RX ORDER — DULOXETIN HYDROCHLORIDE 30 MG/1
30 CAPSULE, DELAYED RELEASE ORAL DAILY
Qty: 30 CAPSULE | Refills: 11 | Status: SHIPPED | OUTPATIENT
Start: 2025-01-14 | End: 2025-03-11 | Stop reason: ALTCHOICE

## 2025-01-14 RX ORDER — VENLAFAXINE HYDROCHLORIDE 75 MG/1
75 CAPSULE, EXTENDED RELEASE ORAL DAILY
Qty: 90 CAPSULE | Refills: 0 | Status: SHIPPED | OUTPATIENT
Start: 2025-01-14 | End: 2025-03-11 | Stop reason: SDUPTHER

## 2025-01-14 RX ORDER — BUSPIRONE HYDROCHLORIDE 30 MG/1
30 TABLET ORAL 2 TIMES DAILY
Qty: 180 TABLET | Refills: 1 | Status: SHIPPED | OUTPATIENT
Start: 2025-01-14 | End: 2025-07-13

## 2025-01-14 RX ORDER — CLONAZEPAM 0.5 MG/1
0.5 TABLET ORAL 2 TIMES DAILY PRN
Qty: 60 TABLET | Refills: 2 | Status: SHIPPED | OUTPATIENT
Start: 2025-01-14 | End: 2025-03-11 | Stop reason: SDUPTHER

## 2025-01-14 ASSESSMENT — PATIENT HEALTH QUESTIONNAIRE - PHQ9
8. MOVING OR SPEAKING SO SLOWLY THAT OTHER PEOPLE COULD HAVE NOTICED. OR THE OPPOSITE, BEING SO FIGETY OR RESTLESS THAT YOU HAVE BEEN MOVING AROUND A LOT MORE THAN USUAL: NOT AT ALL
2. FEELING DOWN, DEPRESSED OR HOPELESS: SEVERAL DAYS
6. FEELING BAD ABOUT YOURSELF - OR THAT YOU ARE A FAILURE OR HAVE LET YOURSELF OR YOUR FAMILY DOWN: SEVERAL DAYS
SUM OF ALL RESPONSES TO PHQ9 QUESTIONS 1 AND 2: 3
SUM OF ALL RESPONSES TO PHQ QUESTIONS 1-9: 9
7. TROUBLE CONCENTRATING ON THINGS, SUCH AS READING THE NEWSPAPER OR WATCHING TELEVISION: SEVERAL DAYS
10. IF YOU CHECKED OFF ANY PROBLEMS, HOW DIFFICULT HAVE THESE PROBLEMS MADE IT FOR YOU TO DO YOUR WORK, TAKE CARE OF THINGS AT HOME, OR GET ALONG WITH OTHER PEOPLE: SOMEWHAT DIFFICULT
1. LITTLE INTEREST OR PLEASURE IN DOING THINGS: MORE THAN HALF THE DAYS
4. FEELING TIRED OR HAVING LITTLE ENERGY: SEVERAL DAYS
9. THOUGHTS THAT YOU WOULD BE BETTER OFF DEAD, OR OF HURTING YOURSELF: NOT AT ALL
5. POOR APPETITE OR OVEREATING: SEVERAL DAYS
3. TROUBLE FALLING OR STAYING ASLEEP OR SLEEPING TOO MUCH: MORE THAN HALF THE DAYS

## 2025-01-14 NOTE — TELEPHONE ENCOUNTER
Also [facility] called and stated that typically a patient is not prescribed 2 SNRI medications.  He is referring to Venlafaxine and Duloxetine.  Please confirm.    [facility] P:  [phone]

## 2025-01-14 NOTE — PROGRESS NOTES
Subjective   Patient ID: Leodan Abel is a 64 y.o. male who presents for Med Refill and Follow-up (Sometimes he gets panic attacks and when he does he gets chest tightness./Declines EKG due to financial issues.).    Hypertension  This is a recurrent problem. The current episode started more than 1 year ago. The problem is unchanged. The problem is controlled. Associated symptoms include anxiety, chest pain and malaise/fatigue. Pertinent negatives include no blurred vision, headaches, neck pain, orthopnea, palpitations, peripheral edema, PND, shortness of breath or sweats. There are no associated agents to hypertension. Risk factors for coronary artery disease include diabetes mellitus, dyslipidemia, obesity and stress. Compliance problems include exercise.     Denies history of heart disease. Patient is having anxiety and tightness in his chest and down but it is better. Patient thinks the symptoms are related to anxiety.  Did order an EKG for him to complete at the [facility] where he works.  We also discussed signs and symptoms of angina when to go to seek medical attention immediately.  Also he may benefit from knowing his cardiovascular risk with a CT calcium score which he is agreeable.  Patient due on multiple medication refills today.  Patient needs refills on cholesterol medication also medication for anxiety.  Currently on clonazepam and buspirone.  Blood pressure is 126/71 he needs refills on losartan which appears to be working well.  He is also on methocarbamol for history of right hip pain and pantoprazole for GERD.  We discussed transitioning him from Cymbalta to venlafaxine which may help better with depression and anxiety.  He is agreeable to this plan.  Also due for colon cancer screening for which he would like to pursue Cologuard.  Also patient will be due for blood work that he can get at the [facility] which will be ordered today.    OARRS:  Luis Warren, DO on 3/11/2025   4:44 PM  I have personally reviewed the OARRS report for Danny Abel. I have considered the risks of abuse, dependence, addiction and diversion    Is the patient prescribed a combination of a benzodiazepine and opioid?  No    Last Urine Drug Screen / ordered today: No  Recent Results (from the past 8760 hours)   Drug Screen, Urine With Reflex to Confirmation    Collection Time: 06/21/24 10:45 AM   Result Value Ref Range    Amphetamine Screen, Urine Presumptive Negative Presumptive Negative    Barbiturate Screen, Urine Presumptive Negative Presumptive Negative    Benzodiazepines Screen, Urine Presumptive Negative Presumptive Negative    Cannabinoid Screen, Urine Presumptive Negative Presumptive Negative    Cocaine Metabolite Screen, Urine Presumptive Negative Presumptive Negative    Fentanyl Screen, Urine Presumptive Negative Presumptive Negative    Opiate Screen, Urine Presumptive Negative Presumptive Negative    Oxycodone Screen, Urine Presumptive Negative Presumptive Negative    PCP Screen, Urine Presumptive Negative Presumptive Negative    Methadone Screen, Urine Presumptive Negative Presumptive Negative     Results are as expected.         Controlled Substance Agreement:  Date of the Last Agreement: 6/21/2024  Reviewed Controlled Substance Agreement including but not limited to the benefits, risks, and alternatives to treatment with a Controlled Substance medication(s).    Benzodiazepines:  What is the patient's goal of therapy? Help with anxiety  Is this being achieved with current treatment? yes    SANDOR-7:  No data recorded    Activities of Daily Living:   Is your overall impression that this patient is benefiting (symptom reduction outweighs side effects) from benzodiazepine therapy? Yes     1. Physical Functioning: Same  2. Family Relationship: Same  3. Social Relationship: Same  4. Mood: Same  5. Sleep Patterns: Same  6. Overall Function: Same    Review of Systems   Constitutional:  Positive for  malaise/fatigue. Negative for chills and fever.   HENT:  Negative for sore throat.    Eyes:  Negative for blurred vision and visual disturbance.   Respiratory:  Negative for cough and shortness of breath.    Cardiovascular:  Positive for chest pain. Negative for palpitations, orthopnea, leg swelling and PND.   Gastrointestinal:  Negative for abdominal pain, blood in stool, constipation, diarrhea, nausea and vomiting.   Genitourinary:  Negative for dysuria.   Musculoskeletal:  Negative for neck pain.   Skin:  Negative for rash.   Neurological:  Negative for dizziness, syncope, light-headedness and headaches.   Psychiatric/Behavioral:  Negative for agitation, confusion and suicidal ideas. The patient is nervous/anxious.        Objective   /71 (BP Location: Left arm, Patient Position: Sitting, BP Cuff Size: Adult)   Pulse 85   Temp 36.6 °C (97.9 °F)   Wt 94.8 kg (209 lb)   BMI 32.73 kg/m²     Physical Exam  Vitals and nursing note reviewed.   Constitutional:       General: He is not in acute distress.     Appearance: Normal appearance. He is obese. He is not ill-appearing, toxic-appearing or diaphoretic.   HENT:      Head: Normocephalic and atraumatic.      Mouth/Throat:      Mouth: Mucous membranes are moist.      Pharynx: Oropharynx is clear. No oropharyngeal exudate.   Eyes:      Pupils: Pupils are equal, round, and reactive to light.   Cardiovascular:      Rate and Rhythm: Normal rate and regular rhythm.      Heart sounds: Normal heart sounds. No murmur heard.  Pulmonary:      Effort: Pulmonary effort is normal. No respiratory distress.      Breath sounds: Normal breath sounds. No wheezing, rhonchi or rales.   Abdominal:      General: There is no distension.      Palpations: Abdomen is soft. There is no mass.      Tenderness: There is no abdominal tenderness.   Musculoskeletal:      Cervical back: Neck supple.      Right lower leg: No edema.      Left lower leg: No edema.   Lymphadenopathy:      Cervical:  No cervical adenopathy.   Skin:     General: Skin is warm and dry.      Coloration: Skin is not jaundiced.   Neurological:      General: No focal deficit present.      Mental Status: He is alert and oriented to person, place, and time.      Cranial Nerves: No cranial nerve deficit.   Psychiatric:         Mood and Affect: Mood normal.         Behavior: Behavior normal.         Thought Content: Thought content normal.         Judgment: Judgment normal.         Assessment/Plan   Problem List Items Addressed This Visit           ICD-10-CM    Diabetes mellitus with cataract E11.36    Relevant Orders    Hemoglobin A1C    Albumin-Creatinine Ratio, Urine Random    CT cardiac scoring wo IV contrast    SANDOR (generalized anxiety disorder) F41.1    Relevant Medications    busPIRone (Buspar) 30 mg tablet    GERD (gastroesophageal reflux disease) K21.9    Relevant Medications    pantoprazole (ProtoNix) 40 mg EC tablet    Hypertension - Primary I10    Relevant Medications    losartan (Cozaar) 100 mg tablet    metoprolol tartrate (Lopressor) 100 mg tablet    Other Relevant Orders    CBC and Auto Differential    Comprehensive metabolic panel    CT cardiac scoring wo IV contrast    Mixed hyperlipidemia E78.2    Relevant Medications    atorvastatin (Lipitor) 20 mg tablet    Other Relevant Orders    CBC and Auto Differential    Comprehensive metabolic panel    Lipid panel    CT cardiac scoring wo IV contrast    Presence of right artificial hip joint Z96.641    Vitamin D deficiency E55.9    Relevant Orders    Vitamin D 25-Hydroxy,Total (for eval of Vitamin D levels)    Moderate episode of recurrent major depressive disorder F33.1    Acne vulgaris L70.0    Relevant Medications    doxycycline (Vibramycin) 100 mg capsule    Rapid heartbeat R00.0    Relevant Orders    ECG 12 lead (Ancillary Performed)    Chest tightness R07.89    Relevant Orders    ECG 12 lead (Ancillary Performed)    Rosacea, acne L71.9    Relevant Medications     doxycycline (Vibramycin) 100 mg capsule    Prostate cancer screening Z12.5    Relevant Orders    Prostate Spec.Ag,Screen    Colon cancer screening Z12.11    Relevant Orders    Cologuard® colon cancer screening (Completed)     Diabetes mellitus with cataract: Check hemoglobin A1c and albumin creatinine ratio.  At this time he is on a continue on Jardiance.    Generalized anxiety disorder: Continue buspirone and we will going to transition him from Cymbalta to venlafaxine.  We discussed how to wean/cross titrate.    GERD: Chronic, stable refills provided for pantoprazole    Hypertension: Chronic, stable refills provided for losartan and metoprolol    Mixed hyperlipidemia: Chronic, stable continue atorvastatin.  From the check CT cardiac score    Presence of right artificial hip joint: Still has intermittent pain for which she takes methocarbamol    Vitamin D deficiency: Check a vitamin D level    Moderate episode of recurrent major depressive disorder: We discussed however the transition from Cymbalta to venlafaxine and have him follow-up for recheck in approximately 1/2 months    Chest tightness/rapid heartbeat: Check a EKG.  Not having the symptoms currently.  CT calcium score will be obtained as well.  If he has any persistent symptoms to go to the ER.  He thinks this is more related to uncontrolled anxiety.    Acne vulgaris/acne rosacea: He will be continued on doxycycline    Prostate cancer screening will check a PSA level    Colon cancer screening: Will order Cologuard

## 2025-01-17 DIAGNOSIS — Z96.641 PRESENCE OF RIGHT ARTIFICIAL HIP JOINT: ICD-10-CM

## 2025-01-21 RX ORDER — METHOCARBAMOL 500 MG/1
500 TABLET, FILM COATED ORAL 2 TIMES DAILY
Qty: 180 TABLET | Refills: 1 | Status: SHIPPED | OUTPATIENT
Start: 2025-01-21 | End: 2025-07-20

## 2025-01-29 ENCOUNTER — TELEPHONE (OUTPATIENT)
Dept: PRIMARY CARE | Facility: CLINIC | Age: 65
End: 2025-01-29

## 2025-01-29 NOTE — TELEPHONE ENCOUNTER
Patient called in asking for a letter to be written stating that his cat, Barbara is his  animal.  He is moving into a new facility and if this letter is written they will waive the fee.  The cat's name must be in the letter.  Please advise.

## 2025-01-30 NOTE — TELEPHONE ENCOUNTER
Attempted to reach patient. Left voice mail to call back to decide if he will  the letter or I mail it to him

## 2025-03-11 ENCOUNTER — APPOINTMENT (OUTPATIENT)
Dept: PRIMARY CARE | Facility: CLINIC | Age: 65
End: 2025-03-11

## 2025-03-11 VITALS
BODY MASS INDEX: 32.33 KG/M2 | WEIGHT: 206 LBS | HEART RATE: 80 BPM | HEIGHT: 67 IN | SYSTOLIC BLOOD PRESSURE: 134 MMHG | TEMPERATURE: 98.1 F | DIASTOLIC BLOOD PRESSURE: 81 MMHG

## 2025-03-11 DIAGNOSIS — R74.8 ELEVATED ALKALINE PHOSPHATASE LEVEL: ICD-10-CM

## 2025-03-11 DIAGNOSIS — I10 PRIMARY HYPERTENSION: ICD-10-CM

## 2025-03-11 DIAGNOSIS — E11.36 DIABETES MELLITUS WITH CATARACT: ICD-10-CM

## 2025-03-11 DIAGNOSIS — E78.2 MIXED HYPERLIPIDEMIA: ICD-10-CM

## 2025-03-11 DIAGNOSIS — F41.1 GAD (GENERALIZED ANXIETY DISORDER): ICD-10-CM

## 2025-03-11 DIAGNOSIS — Z12.5 PROSTATE CANCER SCREENING: ICD-10-CM

## 2025-03-11 DIAGNOSIS — E11.41 TYPE 2 DIABETES MELLITUS WITH DIABETIC MONONEUROPATHY, WITHOUT LONG-TERM CURRENT USE OF INSULIN: Primary | ICD-10-CM

## 2025-03-11 DIAGNOSIS — Z79.899 CONTROLLED SUBSTANCE AGREEMENT SIGNED: ICD-10-CM

## 2025-03-11 DIAGNOSIS — F33.9 RECURRENT DEPRESSIVE DISORDER (CMS-HCC): ICD-10-CM

## 2025-03-11 PROCEDURE — 3075F SYST BP GE 130 - 139MM HG: CPT | Performed by: INTERNAL MEDICINE

## 2025-03-11 PROCEDURE — 3079F DIAST BP 80-89 MM HG: CPT | Performed by: INTERNAL MEDICINE

## 2025-03-11 PROCEDURE — G8433 SCR FOR DEP NOT CPT DOC RSN: HCPCS | Performed by: INTERNAL MEDICINE

## 2025-03-11 PROCEDURE — 99213 OFFICE O/P EST LOW 20 MIN: CPT | Performed by: INTERNAL MEDICINE

## 2025-03-11 PROCEDURE — 3008F BODY MASS INDEX DOCD: CPT | Performed by: INTERNAL MEDICINE

## 2025-03-11 PROCEDURE — 4010F ACE/ARB THERAPY RXD/TAKEN: CPT | Performed by: INTERNAL MEDICINE

## 2025-03-11 RX ORDER — CLONAZEPAM 0.5 MG/1
0.5 TABLET ORAL 2 TIMES DAILY
Qty: 60 TABLET | Refills: 2 | Status: SHIPPED | OUTPATIENT
Start: 2025-03-11 | End: 2025-06-09

## 2025-03-11 RX ORDER — VENLAFAXINE HYDROCHLORIDE 75 MG/1
75 CAPSULE, EXTENDED RELEASE ORAL DAILY
Qty: 90 CAPSULE | Refills: 3 | Status: SHIPPED | OUTPATIENT
Start: 2025-03-11 | End: 2026-03-11

## 2025-03-11 ASSESSMENT — PATIENT HEALTH QUESTIONNAIRE - PHQ9
1. LITTLE INTEREST OR PLEASURE IN DOING THINGS: NOT AT ALL
SUM OF ALL RESPONSES TO PHQ9 QUESTIONS 1 AND 2: 0
2. FEELING DOWN, DEPRESSED OR HOPELESS: NOT AT ALL

## 2025-03-11 ASSESSMENT — PROMIS GLOBAL HEALTH SCALE
RATE_QUALITY_OF_LIFE: GOOD
RATE_AVERAGE_FATIGUE: MILD
EMOTIONAL_PROBLEMS: SOMETIMES
RATE_GENERAL_HEALTH: GOOD
RATE_MENTAL_HEALTH: GOOD
RATE_AVERAGE_PAIN: 1
CARRYOUT_PHYSICAL_ACTIVITIES: MOSTLY
RATE_PHYSICAL_HEALTH: GOOD
RATE_SOCIAL_SATISFACTION: VERY GOOD
CARRYOUT_SOCIAL_ACTIVITIES: VERY GOOD

## 2025-03-11 NOTE — PROGRESS NOTES
Subjective   Patient ID: Leodan Abel is a 64 y.o. male who presents for Follow-up (Follow up on change in depression meds. He was switched from Cymbalta to Venlafaxine last visit.  He is doing much better./Follow up on recent EKG, labs./Cologuard went back yesterday.).    HPI  Patient has been on the effexor for 4-5 weeks and is doing well. Panic attacks went away and palpitations went away.  Patient had transitioned from Cymbalta to venlafaxine during last office visit.  Patient would like to remain on the current medication.  Patient needs refills on Klonopin thiamine.  OARRS report is reviewed and appropriate.  Patient following up every 3 months for management.    We reviewed his most recent blood work from 1/15/2025.  Vitamin D level was 30.8, PSA was 0.49, lipid panel showed total cholesterol 159 triglycerides 204, HDL 46, LDL 72, hemoglobin A1c was 8.8, CMP with alkaline phosphatase 145, glucose 286, creatinine 0.67, CBC was unremarkable, urine microalbumin ratio was normal.  Patient is only been taking a half a tablet of Jardiance.  Discussed the importance of low carbohydrate diet.    Patient also had recent EKG that was reviewed today with him.  EKG shows sinus rhythm with sinus arrhythmia 80 bpm, normal axis, DC interval 204 ms, Q RS duration 72 ms, QTc 389 ms.  Nonspecific ST-T wave changes.    OARRS:  Luis Warren, DO on 3/11/2025  4:44 PM  I have personally reviewed the OARRS report for Danny Abel. I have considered the risks of abuse, dependence, addiction and diversion    Is the patient prescribed a combination of a benzodiazepine and opioid?  No    Last Urine Drug Screen / ordered today: No  Recent Results (from the past 8760 hours)   Drug Screen, Urine With Reflex to Confirmation    Collection Time: 06/21/24 10:45 AM   Result Value Ref Range    Amphetamine Screen, Urine Presumptive Negative Presumptive Negative    Barbiturate Screen, Urine Presumptive Negative Presumptive  Negative    Benzodiazepines Screen, Urine Presumptive Negative Presumptive Negative    Cannabinoid Screen, Urine Presumptive Negative Presumptive Negative    Cocaine Metabolite Screen, Urine Presumptive Negative Presumptive Negative    Fentanyl Screen, Urine Presumptive Negative Presumptive Negative    Opiate Screen, Urine Presumptive Negative Presumptive Negative    Oxycodone Screen, Urine Presumptive Negative Presumptive Negative    PCP Screen, Urine Presumptive Negative Presumptive Negative    Methadone Screen, Urine Presumptive Negative Presumptive Negative     Results are as expected.         Controlled Substance Agreement:  Date of the Last Agreement: 6/1/2024  Reviewed Controlled Substance Agreement including but not limited to the benefits, risks, and alternatives to treatment with a Controlled Substance medication(s).    Benzodiazepines:  What is the patient's goal of therapy? Treat acute anxiety  Is this being achieved with current treatment? yes    SANDOR-7:  No data recorded    Activities of Daily Living:   Is your overall impression that this patient is benefiting (symptom reduction outweighs side effects) from benzodiazepine therapy? Yes     1. Physical Functioning: Same  2. Family Relationship: Same  3. Social Relationship: Same  4. Mood: Same  5. Sleep Patterns: Same  6. Overall Function: Same    Review of Systems   Constitutional:  Negative for chills and fever.   HENT:  Negative for sore throat.    Eyes:  Negative for visual disturbance.   Respiratory:  Negative for cough and shortness of breath.    Cardiovascular:  Negative for chest pain, palpitations and leg swelling.   Gastrointestinal:  Negative for abdominal pain, blood in stool, constipation, diarrhea, nausea and vomiting.   Genitourinary:  Negative for dysuria.   Skin:  Negative for rash.   Neurological:  Negative for dizziness, syncope and light-headedness.   Psychiatric/Behavioral:  Negative for agitation and confusion.        Objective   BP  "134/81 (BP Location: Right arm, Patient Position: Sitting, BP Cuff Size: Adult)   Pulse 80   Temp 36.7 °C (98.1 °F)   Ht 1.702 m (5' 7\")   Wt 93.4 kg (206 lb)   BMI 32.26 kg/m²     Physical Exam  Vitals and nursing note reviewed.   Constitutional:       General: He is not in acute distress.     Appearance: Normal appearance. He is obese. He is not ill-appearing, toxic-appearing or diaphoretic.   HENT:      Head: Normocephalic and atraumatic.      Mouth/Throat:      Mouth: Mucous membranes are moist.      Pharynx: Oropharynx is clear. No oropharyngeal exudate.   Eyes:      Extraocular Movements: Extraocular movements intact.      Pupils: Pupils are equal, round, and reactive to light.   Cardiovascular:      Rate and Rhythm: Normal rate and regular rhythm.      Heart sounds: Normal heart sounds.   Pulmonary:      Effort: Pulmonary effort is normal. No respiratory distress.      Breath sounds: Normal breath sounds. No wheezing, rhonchi or rales.   Abdominal:      General: Bowel sounds are normal. There is no distension.      Palpations: Abdomen is soft. There is no mass.      Tenderness: There is no abdominal tenderness. There is no guarding.   Musculoskeletal:      Cervical back: Neck supple.      Right lower leg: No edema.      Left lower leg: No edema.   Lymphadenopathy:      Cervical: No cervical adenopathy.   Skin:     General: Skin is warm and dry.      Coloration: Skin is not jaundiced or pale.      Findings: No rash.   Neurological:      General: No focal deficit present.      Mental Status: He is alert and oriented to person, place, and time.      Cranial Nerves: No cranial nerve deficit.      Sensory: No sensory deficit.   Psychiatric:         Mood and Affect: Mood normal.         Behavior: Behavior normal.         Thought Content: Thought content normal.         Judgment: Judgment normal.         Assessment/Plan   Problem List Items Addressed This Visit             ICD-10-CM    Diabetes mellitus with " cataract E11.36    Relevant Medications    empagliflozin (Jardiance) 25 mg    Other Relevant Orders    Hemoglobin A1C    SANDOR (generalized anxiety disorder) F41.1    Relevant Medications    venlafaxine XR (Effexor-XR) 75 mg 24 hr capsule    clonazePAM (KlonoPIN) 0.5 mg tablet    Hypertension I10    Mixed hyperlipidemia E78.2    Controlled substance agreement signed Z79.899    Relevant Orders    Drug Screen, Urine With Reflex to Confirmation    Recurrent depressive disorder (CMS-HCC) F33.9    Elevated alkaline phosphatase level R74.8    Relevant Orders    Comprehensive metabolic panel    Alkaline Phosphatase, Isoenzymes    Prostate cancer screening Z12.5    Relevant Medications    venlafaxine XR (Effexor-XR) 75 mg 24 hr capsule    Type 2 diabetes mellitus with diabetic mononeuropathy, without long-term current use of insulin - Primary E11.41     Diabetes mellitus with cataract: Advised patient to take the full dose of Jardiance every single day and if sugars are uncontrolled we will need to add a second agent.  Will recheck hemoglobin A1c in 3 months needs to watch his carbohydrates closely cut back on sugars and rice potatoes breads.    Generalized Candice disorder chronic, stable refills provided for venlafaxine and Klonopin    Hypertension: Chronic, stable continue current medication regimen of losartan    Hyperlipidemia: Chronic, stable continue current medication regimen atorvastatin      Elevated alkaline phosphatase level: Etiology is unclear we will check alkaline phosphatase isoenzymes.    Prostate cancer screening: We will check a PSA level

## 2025-03-16 LAB — NONINV COLON CA DNA+OCC BLD SCRN STL QL: NEGATIVE

## 2025-04-01 ASSESSMENT — ENCOUNTER SYMPTOMS
SORE THROAT: 0
DIZZINESS: 0
ABDOMINAL PAIN: 0
FEVER: 0
CONFUSION: 0
CONSTIPATION: 0
BLOOD IN STOOL: 0
LIGHT-HEADEDNESS: 0
SHORTNESS OF BREATH: 0
NAUSEA: 0
DYSURIA: 0
DIARRHEA: 0
COUGH: 0
AGITATION: 0
PALPITATIONS: 0
CHILLS: 0
VOMITING: 0

## 2025-04-21 ENCOUNTER — TELEPHONE (OUTPATIENT)
Dept: PRIMARY CARE | Facility: CLINIC | Age: 65
End: 2025-04-21

## 2025-04-21 NOTE — TELEPHONE ENCOUNTER
----- Message from Luis Warren sent at 4/18/2025  6:08 PM EDT -----  Patient's Cologuard test was negative.  This indicates a low likelihood that a colorectal cancer or precancer is present.  Will repeat a Cologuard test in 3 years.  ----- Message -----  From: Dona Gold Results In  Sent: 3/16/2025   6:26 AM EDT  To: Luis Warren, DO

## 2025-04-27 PROBLEM — Z12.11 COLON CANCER SCREENING: Status: ACTIVE | Noted: 2025-04-27

## 2025-04-27 ASSESSMENT — ENCOUNTER SYMPTOMS
NERVOUS/ANXIOUS: 1
COUGH: 0
SWEATS: 0
SORE THROAT: 0
BLURRED VISION: 0
CONSTIPATION: 0
CHILLS: 0
PALPITATIONS: 0
LIGHT-HEADEDNESS: 0
SHORTNESS OF BREATH: 0
DIZZINESS: 0
PND: 0
AGITATION: 0
VOMITING: 0
CONFUSION: 0
NAUSEA: 0
ORTHOPNEA: 0
DIARRHEA: 0
HYPERTENSION: 1
NECK PAIN: 0
ABDOMINAL PAIN: 0
HEADACHES: 0
BLOOD IN STOOL: 0
FEVER: 0
DYSURIA: 0

## 2025-05-09 DIAGNOSIS — E11.36 DIABETES MELLITUS WITH CATARACT: ICD-10-CM

## 2025-05-15 ENCOUNTER — HOSPITAL ENCOUNTER (OUTPATIENT)
Dept: RADIOLOGY | Facility: HOSPITAL | Age: 65
Discharge: HOME | End: 2025-05-15

## 2025-05-15 DIAGNOSIS — E11.36 DIABETES MELLITUS WITH CATARACT: ICD-10-CM

## 2025-05-15 DIAGNOSIS — I10 PRIMARY HYPERTENSION: ICD-10-CM

## 2025-05-15 DIAGNOSIS — E78.2 MIXED HYPERLIPIDEMIA: ICD-10-CM

## 2025-05-15 PROCEDURE — 75571 CT HRT W/O DYE W/CA TEST: CPT

## 2025-05-19 ENCOUNTER — TELEPHONE (OUTPATIENT)
Dept: PRIMARY CARE | Facility: CLINIC | Age: 65
End: 2025-05-19

## 2025-05-19 NOTE — TELEPHONE ENCOUNTER
In regards to the CT cardiac score test result..  The patient is not having any respiatory sx's.  He feels fine.  He does pay out of pocket for his office visits.  He wants to know if he can wait to discuss this at his June 30 appt?

## 2025-05-20 NOTE — TELEPHONE ENCOUNTER
Per Dr Warren ok to wait until June 30.  Did explain that Dr Warren wants him to see cardio for the abnormal Calcium score test.  He verbalized understanding.  He will see someone with [facility].

## 2025-06-23 ASSESSMENT — PROMIS GLOBAL HEALTH SCALE
RATE_SOCIAL_SATISFACTION: FAIR
RATE_AVERAGE_FATIGUE: MILD
RATE_AVERAGE_PAIN: 1
RATE_MENTAL_HEALTH: GOOD
RATE_PHYSICAL_HEALTH: GOOD
RATE_GENERAL_HEALTH: VERY GOOD
CARRYOUT_SOCIAL_ACTIVITIES: VERY GOOD
EMOTIONAL_PROBLEMS: SOMETIMES
RATE_QUALITY_OF_LIFE: GOOD
CARRYOUT_PHYSICAL_ACTIVITIES: MOSTLY

## 2025-06-30 ENCOUNTER — APPOINTMENT (OUTPATIENT)
Dept: PRIMARY CARE | Facility: CLINIC | Age: 65
End: 2025-06-30

## 2025-06-30 VITALS
HEIGHT: 67 IN | TEMPERATURE: 98.5 F | DIASTOLIC BLOOD PRESSURE: 90 MMHG | BODY MASS INDEX: 31.71 KG/M2 | SYSTOLIC BLOOD PRESSURE: 141 MMHG | HEART RATE: 87 BPM | WEIGHT: 202 LBS

## 2025-06-30 DIAGNOSIS — E78.2 MIXED HYPERLIPIDEMIA: ICD-10-CM

## 2025-06-30 DIAGNOSIS — R93.89 ABNORMAL CHEST X-RAY: ICD-10-CM

## 2025-06-30 DIAGNOSIS — Z96.641 PRESENCE OF RIGHT ARTIFICIAL HIP JOINT: ICD-10-CM

## 2025-06-30 DIAGNOSIS — L71.9 ROSACEA, ACNE: ICD-10-CM

## 2025-06-30 DIAGNOSIS — E11.41 TYPE 2 DIABETES MELLITUS WITH DIABETIC MONONEUROPATHY, WITHOUT LONG-TERM CURRENT USE OF INSULIN: ICD-10-CM

## 2025-06-30 DIAGNOSIS — K21.9 GASTROESOPHAGEAL REFLUX DISEASE WITHOUT ESOPHAGITIS: ICD-10-CM

## 2025-06-30 DIAGNOSIS — R74.8 ELEVATED ALKALINE PHOSPHATASE LEVEL: Primary | ICD-10-CM

## 2025-06-30 DIAGNOSIS — R91.8 LUNG INFILTRATE ON CT: ICD-10-CM

## 2025-06-30 DIAGNOSIS — L70.0 ACNE VULGARIS: ICD-10-CM

## 2025-06-30 DIAGNOSIS — I10 PRIMARY HYPERTENSION: ICD-10-CM

## 2025-06-30 DIAGNOSIS — F41.1 GAD (GENERALIZED ANXIETY DISORDER): ICD-10-CM

## 2025-06-30 PROCEDURE — 3008F BODY MASS INDEX DOCD: CPT | Performed by: INTERNAL MEDICINE

## 2025-06-30 PROCEDURE — 3077F SYST BP >= 140 MM HG: CPT | Performed by: INTERNAL MEDICINE

## 2025-06-30 PROCEDURE — 99214 OFFICE O/P EST MOD 30 MIN: CPT | Performed by: INTERNAL MEDICINE

## 2025-06-30 PROCEDURE — 4010F ACE/ARB THERAPY RXD/TAKEN: CPT | Performed by: INTERNAL MEDICINE

## 2025-06-30 PROCEDURE — 3080F DIAST BP >= 90 MM HG: CPT | Performed by: INTERNAL MEDICINE

## 2025-06-30 RX ORDER — LOSARTAN POTASSIUM 100 MG/1
100 TABLET ORAL DAILY
Qty: 90 TABLET | Refills: 1 | Status: SHIPPED | OUTPATIENT
Start: 2025-06-30 | End: 2025-12-27

## 2025-06-30 RX ORDER — CLONAZEPAM 0.5 MG/1
0.5 TABLET ORAL 2 TIMES DAILY
Qty: 60 TABLET | Refills: 2 | Status: SHIPPED | OUTPATIENT
Start: 2025-06-30 | End: 2025-09-28

## 2025-06-30 RX ORDER — BUSPIRONE HYDROCHLORIDE 30 MG/1
30 TABLET ORAL 2 TIMES DAILY
Qty: 180 TABLET | Refills: 1 | Status: SHIPPED | OUTPATIENT
Start: 2025-06-30 | End: 2025-12-27

## 2025-06-30 RX ORDER — ATORVASTATIN CALCIUM 40 MG/1
40 TABLET, FILM COATED ORAL DAILY
Qty: 90 TABLET | Refills: 1 | Status: SHIPPED | OUTPATIENT
Start: 2025-06-30 | End: 2025-12-27

## 2025-06-30 RX ORDER — METHOCARBAMOL 500 MG/1
500 TABLET, FILM COATED ORAL 2 TIMES DAILY
Qty: 180 TABLET | Refills: 1 | Status: SHIPPED | OUTPATIENT
Start: 2025-06-30 | End: 2025-12-27

## 2025-06-30 RX ORDER — METOPROLOL TARTRATE 100 MG/1
100 TABLET ORAL 2 TIMES DAILY
Qty: 180 TABLET | Refills: 1 | Status: SHIPPED | OUTPATIENT
Start: 2025-06-30

## 2025-06-30 RX ORDER — PANTOPRAZOLE SODIUM 40 MG/1
40 TABLET, DELAYED RELEASE ORAL DAILY
Qty: 90 TABLET | Refills: 1 | Status: SHIPPED | OUTPATIENT
Start: 2025-06-30 | End: 2025-12-27

## 2025-06-30 RX ORDER — DOXYCYCLINE 100 MG/1
100 CAPSULE ORAL DAILY
Qty: 90 CAPSULE | Refills: 1 | Status: SHIPPED | OUTPATIENT
Start: 2025-06-30 | End: 2025-12-27

## 2025-07-14 ENCOUNTER — TELEPHONE (OUTPATIENT)
Dept: PRIMARY CARE | Facility: CLINIC | Age: 65
End: 2025-07-14

## 2025-07-14 ENCOUNTER — PATIENT MESSAGE (OUTPATIENT)
Dept: PRIMARY CARE | Facility: CLINIC | Age: 65
End: 2025-07-14

## 2025-07-15 ASSESSMENT — ENCOUNTER SYMPTOMS
CONSTIPATION: 0
BLOOD IN STOOL: 0
NAUSEA: 0
DIZZINESS: 0
SORE THROAT: 0
CHILLS: 0
VOMITING: 0
COUGH: 0
PALPITATIONS: 0
AGITATION: 0
CONFUSION: 0
DYSURIA: 0
DIARRHEA: 0
FEVER: 0
SHORTNESS OF BREATH: 0
LIGHT-HEADEDNESS: 0
ABDOMINAL PAIN: 0

## 2025-07-15 NOTE — PROGRESS NOTES
"Subjective   Patient ID: Danny Abel \"Sree" is a 64 y.o. male who presents for Results (Lab review, CT calcium score test) and Med Refill.  History of Present Illness  Danny Abel \"Sree" is a 64 year old male with diabetes who presents for medication refills and follow-up on recent diagnostic results.    He is currently taking atorvastatin 20 mg since 2008 and Jardiance 25 mg for diabetes. His recent A1c is 8.4, down from 8.8, and he has experienced unintentional weight loss of about four pounds since starting Jardiance. He has a high coronary artery calcium score of 700 but has not experienced any chest pain or significant symptoms.    He has a history of pneumonia concerns, for which he visited urgent care two weeks after a CT scan. He was prescribed doxycycline and Augmentin but only took one dose of Augmentin due to gastrointestinal side effects. A spot on his lung found in 2016 has since shrunk and was not mentioned in recent imaging.    He has a history of kidney stones and takes tamsulosin as needed. He also takes Klonopin for anxiety and has a history of elevated alkaline phosphatase, which has been consistent over time.    No alcohol use and a history of smoking, which he quit after a lung spot was found. 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 He is considering returning to a previous employer due to dissatisfaction with his current role.    No chest pain, cough, fever, or shortness of breath. Reports a hoarse voice and occasional gastrointestinal side effects from medications.    OARRS:  Luis Warren DO on 7/15/2025  6:50 AM  I have personally reviewed the OARRS report for Danny Abel. I have considered the risks of abuse, dependence, addiction and diversion    Is the patient prescribed a combination of a benzodiazepine and opioid?  No    Last Urine Drug Screen / ordered today: " "Yes  No results found for this or any previous visit (from the past 8760 hours).  N/A      Controlled Substance Agreement:  Date of the Last Agreement: 6/30/2025  Reviewed Controlled Substance Agreement including but not limited to the benefits, risks, and alternatives to treatment with a Controlled Substance medication(s).    Benzodiazepines:  What is the patient's goal of therapy? sleep  Is this being achieved with current treatment? yes    SANDOR-7:  No data recorded    Activities of Daily Living:   Is your overall impression that this patient is benefiting (symptom reduction outweighs side effects) from benzodiazepine therapy? Yes     1. Physical Functioning: Same  2. Family Relationship: Same  3. Social Relationship: Same  4. Mood: Same  5. Sleep Patterns: Same  6. Overall Function: Same    Review of Systems   Constitutional:  Negative for chills and fever.   HENT:  Negative for sore throat.    Eyes:  Negative for visual disturbance.   Respiratory:  Negative for cough and shortness of breath.    Cardiovascular:  Negative for chest pain, palpitations and leg swelling.   Gastrointestinal:  Negative for abdominal pain, blood in stool, constipation, diarrhea, nausea and vomiting.   Genitourinary:  Negative for dysuria.   Skin:  Negative for rash.   Neurological:  Negative for dizziness, syncope and light-headedness.   Psychiatric/Behavioral:  Negative for agitation and confusion.        Objective     /90 (BP Location: Right arm, Patient Position: Sitting, BP Cuff Size: Adult)   Pulse 87   Temp 36.9 °C (98.5 °F)   Ht 1.702 m (5' 7\")   Wt 91.6 kg (202 lb)   BMI 31.64 kg/m²      Physical Exam  Vitals and nursing note reviewed.   Constitutional:       General: He is not in acute distress.     Appearance: Normal appearance. He is not ill-appearing, toxic-appearing or diaphoretic.   HENT:      Head: Normocephalic and atraumatic.      Mouth/Throat:      Mouth: Mucous membranes are moist.      Pharynx: Oropharynx " is clear. No oropharyngeal exudate.   Eyes:      Pupils: Pupils are equal, round, and reactive to light.   Cardiovascular:      Rate and Rhythm: Normal rate and regular rhythm.      Heart sounds: Normal heart sounds.   Pulmonary:      Effort: Pulmonary effort is normal. No respiratory distress.      Breath sounds: Normal breath sounds. No wheezing or rhonchi.   Abdominal:      General: There is no distension.      Palpations: Abdomen is soft.      Tenderness: There is no abdominal tenderness.   Musculoskeletal:      Cervical back: Neck supple.      Right lower leg: No edema.      Left lower leg: No edema.   Lymphadenopathy:      Cervical: No cervical adenopathy.   Skin:     General: Skin is warm and dry.      Coloration: Skin is not jaundiced or pale.      Findings: No rash.   Neurological:      General: No focal deficit present.      Mental Status: He is alert and oriented to person, place, and time.      Cranial Nerves: No cranial nerve deficit.   Psychiatric:         Mood and Affect: Mood normal.         Behavior: Behavior normal.         Thought Content: Thought content normal.         Judgment: Judgment normal.        Physical Exam  HEENT: Eyes normal.  CARDIOVASCULAR: Heart sounds normal.  ABDOMEN: Abdomen non-tender.    Assessment & Plan  Coronary artery disease  He has a high coronary artery calcium score of 700, indicating significant plaque buildup. The right coronary artery has no plaque, but the left anterior descending artery has a score of 350, and the left circumflex artery has a score of 224. He is at higher risk due to diabetes, hyperlipidemia, and hypertension. The atorvastatin dose is currently at 20 mg, which is moderate intensity. Increasing the dose to 40 mg is recommended to achieve high-intensity statin therapy, which is advised for individuals with high coronary artery calcium scores to prevent further plaque buildup. The goal LDL for secondary prevention is less than 55 mg/dL, and for high  coronary scores, it is recommended to be less than 70 mg/dL, with consideration to be less than 55 mg/dL. His 10-year ASCVD risk score is 19.3%.  - Increase atorvastatin to 40 mg daily.  - Send atorvastatin prescription to [facility] pharmacy.  - Consider stress test if symptoms develop or if recommended by cardiologist.    Type 2 diabetes mellitus  His A1c has decreased from 8.8% to 8.4% but remains above target. He is currently on empagliflozin (Jardiance) 25 mg daily. Discussed the possibility of adding Rybelsus, a non-injectable GLP-1 receptor agonist, to further lower blood glucose levels. He is concerned about potential side effects such as nausea and prefers not to use injectable medications. Rybelsus could help with weight loss and appetite control. He is advised to continue dietary modifications and exercise to help lower blood glucose levels.  - Send Rybelsus prescription to [facility] pharmacy to check coverage.  - Continue empagliflozin (Jardiance) 25 mg daily.  - Encourage dietary modifications and exercise to help lower blood glucose levels.    Elevated alkaline phosphatase level  His alkaline phosphatase level has been elevated for a long time, possibly indicating fatty liver. The elevation is suspected to be due to liver irritation rather than bone disease, as other liver enzymes are normal. An ultrasound is recommended to rule out any lesions or other causes of liver irritation.  - Order liver ultrasound to evaluate for fatty liver or other lesions.    Generalized anxiety disorder  He reports increased worry and anxiety, possibly related to work stress and loneliness. He is currently on venlafaxine XR 75 mg daily and clonazepam 0.5 mg as needed. Discussed the possibility of increasing the venlafaxine dose to 150 mg if needed, but he is concerned about feeling overmedicated.  - Continue venlafaxine XR 75 mg daily.  - Continue clonazepam 0.5 mg as needed.  - Consider increasing venlafaxine XR to 150  mg if anxiety symptoms persist.    Results  LABS  HbA1c: 8.4%  Alkaline Phosphatase: 149 U/L    RADIOLOGY  Calcium Score: 700  CT Scan: Patchy airspace opacities    Problem List Items Addressed This Visit       SANDOR (generalized anxiety disorder)    Relevant Medications    busPIRone (Buspar) 30 mg tablet    clonazePAM (KlonoPIN) 0.5 mg tablet    GERD (gastroesophageal reflux disease)    Relevant Medications    pantoprazole (ProtoNix) 40 mg EC tablet    Hypertension    Relevant Medications    losartan (Cozaar) 100 mg tablet    metoprolol tartrate (Lopressor) 100 mg tablet    Mixed hyperlipidemia    Relevant Medications    atorvastatin (Lipitor) 40 mg tablet    Presence of right artificial hip joint    Relevant Medications    methocarbamol (Robaxin) 500 mg tablet    Acne vulgaris    Relevant Medications    doxycycline (Vibramycin) 100 mg capsule    Rosacea, acne    Relevant Medications    doxycycline (Vibramycin) 100 mg capsule    Elevated alkaline phosphatase level - Primary    Relevant Orders    US right upper quadrant    Type 2 diabetes mellitus with diabetic mononeuropathy, without long-term current use of insulin    Relevant Medications    semaglutide (Rybelsus) 3 mg tablet     Other Visit Diagnoses         Abnormal chest x-ray        Relevant Orders    XR chest 2 views      Lung infiltrate on CT        Relevant Orders    XR chest 2 views              Luis Warren DO     This medical note was created with the assistance of artificial intelligence (AI) for documentation purposes. The content has been reviewed and confirmed by the healthcare provider for accuracy and completeness. Patient consented to the use of audio recording and use of AI during their visit.

## 2025-07-17 DIAGNOSIS — E11.69 ERECTILE DYSFUNCTION DUE TO TYPE 2 DIABETES MELLITUS (MULTI): ICD-10-CM

## 2025-07-17 DIAGNOSIS — N52.1 ERECTILE DYSFUNCTION DUE TO TYPE 2 DIABETES MELLITUS (MULTI): ICD-10-CM

## 2025-07-21 RX ORDER — SILDENAFIL 100 MG/1
100 TABLET, FILM COATED ORAL ONCE AS NEEDED
Qty: 90 TABLET | Refills: 0 | Status: SHIPPED | OUTPATIENT
Start: 2025-07-21

## 2025-10-20 ENCOUNTER — APPOINTMENT (OUTPATIENT)
Dept: PRIMARY CARE | Facility: CLINIC | Age: 65
End: 2025-10-20